# Patient Record
Sex: FEMALE | Race: WHITE | NOT HISPANIC OR LATINO | Employment: FULL TIME | URBAN - METROPOLITAN AREA
[De-identification: names, ages, dates, MRNs, and addresses within clinical notes are randomized per-mention and may not be internally consistent; named-entity substitution may affect disease eponyms.]

---

## 2017-06-29 ENCOUNTER — OFFICE VISIT (OUTPATIENT)
Dept: RETAIL CLINIC | Facility: CLINIC | Age: 27
End: 2017-06-29

## 2017-06-29 DIAGNOSIS — J06.9 UPPER RESPIRATORY TRACT INFECTION, UNSPECIFIED TYPE: ICD-10-CM

## 2017-06-29 DIAGNOSIS — J40 BRONCHITIS: Primary | ICD-10-CM

## 2017-06-29 PROCEDURE — 99213 OFFICE O/P EST LOW 20 MIN: CPT | Performed by: NURSE PRACTITIONER

## 2017-06-29 RX ORDER — BROMPHENIRAMINE MALEATE, PSEUDOEPHEDRINE HYDROCHLORIDE, AND DEXTROMETHORPHAN HYDROBROMIDE 2; 30; 10 MG/5ML; MG/5ML; MG/5ML
5 SYRUP ORAL 4 TIMES DAILY PRN
Qty: 150 ML | Refills: 0 | Status: SHIPPED | OUTPATIENT
Start: 2017-06-29 | End: 2017-06-29 | Stop reason: SDUPTHER

## 2017-06-29 RX ORDER — ALBUTEROL SULFATE 90 UG/1
2 AEROSOL, METERED RESPIRATORY (INHALATION) EVERY 4 HOURS PRN
Qty: 1 INHALER | Refills: 0 | Status: SHIPPED | OUTPATIENT
Start: 2017-06-29 | End: 2017-06-29 | Stop reason: SDUPTHER

## 2017-06-29 RX ORDER — PREDNISONE 10 MG/1
TABLET ORAL
Qty: 21 TABLET | Refills: 0 | Status: SHIPPED | OUTPATIENT
Start: 2017-06-29 | End: 2017-11-21 | Stop reason: SDUPTHER

## 2017-06-29 RX ORDER — ALBUTEROL SULFATE 90 UG/1
2 AEROSOL, METERED RESPIRATORY (INHALATION) EVERY 4 HOURS PRN
Qty: 1 INHALER | Refills: 0 | Status: SHIPPED | OUTPATIENT
Start: 2017-06-29 | End: 2018-01-03

## 2017-06-29 RX ORDER — BROMPHENIRAMINE MALEATE, PSEUDOEPHEDRINE HYDROCHLORIDE, AND DEXTROMETHORPHAN HYDROBROMIDE 2; 30; 10 MG/5ML; MG/5ML; MG/5ML
5 SYRUP ORAL 4 TIMES DAILY PRN
Qty: 150 ML | Refills: 0 | Status: SHIPPED | OUTPATIENT
Start: 2017-06-29 | End: 2018-01-03

## 2017-06-29 RX ORDER — PREDNISONE 10 MG/1
TABLET ORAL
Qty: 21 TABLET | Refills: 0 | Status: SHIPPED | OUTPATIENT
Start: 2017-06-29 | End: 2017-06-29 | Stop reason: SDUPTHER

## 2017-07-01 VITALS
TEMPERATURE: 98.4 F | HEART RATE: 82 BPM | DIASTOLIC BLOOD PRESSURE: 70 MMHG | OXYGEN SATURATION: 98 % | SYSTOLIC BLOOD PRESSURE: 110 MMHG | RESPIRATION RATE: 18 BRPM

## 2017-07-01 PROBLEM — R09.89 CHEST CONGESTION: Status: ACTIVE | Noted: 2017-07-01

## 2017-07-01 PROBLEM — R05.9 COUGH: Status: ACTIVE | Noted: 2017-07-01

## 2017-11-21 ENCOUNTER — OFFICE VISIT (OUTPATIENT)
Dept: RETAIL CLINIC | Facility: CLINIC | Age: 27
End: 2017-11-21

## 2017-11-21 VITALS
HEART RATE: 87 BPM | SYSTOLIC BLOOD PRESSURE: 110 MMHG | OXYGEN SATURATION: 98 % | TEMPERATURE: 99.2 F | DIASTOLIC BLOOD PRESSURE: 78 MMHG
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
## 2017-11-21 PROBLEM — R09.89 CHEST CONGESTION: Status: RESOLVED | Noted: 2017-07-01 | Resolved: 2017-11-21

## 2017-11-21 PROBLEM — R05.9 COUGH: Status: RESOLVED | Noted: 2017-07-01 | Resolved: 2017-11-21

## 2017-11-21 PROCEDURE — 99213 OFFICE O/P EST LOW 20 MIN: CPT | Performed by: NURSE PRACTITIONER

## 2017-11-21 RX ORDER — LEVOTHYROXINE SODIUM 0.07 MG/1
TABLET ORAL
COMMUNITY
Start: 2013-11-21 | End: 2018-04-13

## 2017-11-21 RX ORDER — ESTRADIOL 0.1 MG/G
CREAM VAGINAL
COMMUNITY
Start: 2013-12-06 | End: 2018-01-03

## 2017-11-21 RX ORDER — PREDNISONE 10 MG/1
TABLET ORAL
Qty: 21 TABLET | Refills: 0 | Status: SHIPPED | OUTPATIENT
Start: 2017-11-21 | End: 2018-01-03

## 2017-11-21 RX ORDER — BACLOFEN 20 MG/1
TABLET ORAL
COMMUNITY
Start: 2014-01-07 | End: 2018-01-03

## 2017-11-21 RX ORDER — DULOXETIN HYDROCHLORIDE 30 MG/1
CAPSULE, DELAYED RELEASE ORAL
COMMUNITY
Start: 2014-01-10 | End: 2018-01-03

## 2017-11-21 RX ORDER — PREDNISONE 10 MG/1
TABLET ORAL
Qty: 21 TABLET | Refills: 0 | Status: SHIPPED | OUTPATIENT
Start: 2017-11-21 | End: 2017-11-21 | Stop reason: SDUPTHER

## 2017-11-21 RX ORDER — OXYCODONE HYDROCHLORIDE AND ACETAMINOPHEN 5; 325 MG/1; MG/1
TABLET ORAL
COMMUNITY
Start: 2014-01-17 | End: 2018-01-03

## 2017-11-21 RX ORDER — VALACYCLOVIR HYDROCHLORIDE 500 MG/1
500 TABLET, FILM COATED ORAL AS NEEDED
COMMUNITY
Start: 2014-01-12 | End: 2018-05-25 | Stop reason: SDUPTHER

## 2017-11-21 NOTE — PROGRESS NOTES
Subjective   Oriana Mina is a 27 y.o. female.     HPI Comments: Client came her for the bug bite yesterday, swelling and painful area on her right forearm. She is uptodate for her tetanus shot     Insect Bite   This is a new problem. The current episode started yesterday. The problem has been gradually worsening. Associated symptoms include a rash. Pertinent negatives include no fever or swollen glands. Nothing aggravates the symptoms. She has tried acetaminophen for the symptoms. The treatment provided mild relief.        The following portions of the patient's history were reviewed and updated as appropriate: allergies, current medications, past family history, past medical history, past social history, past surgical history and problem list.    Review of Systems   Constitutional: Negative for fever.   Eyes: Negative.    Respiratory: Negative.    Cardiovascular: Negative.    Skin: Positive for color change and rash.        Around 4cm raised swelling area on the right forearm followed by the bug bite yesteday       Objective   Physical Exam   Constitutional: She appears well-developed and well-nourished.   HENT:   Right Ear: External ear normal.   Left Ear: External ear normal.   Eyes: Pupils are equal, round, and reactive to light.   Neck: Normal range of motion.   Cardiovascular: Normal rate, regular rhythm and normal heart sounds.    Pulmonary/Chest: Effort normal and breath sounds normal. No respiratory distress.   Abdominal: Soft. Bowel sounds are normal.   Skin: Skin is warm and dry. Rash noted. Rash is maculopapular and urticarial.   Around 4cm raised swelling area, tender to touch area  on the right forearm    Nursing note and vitals reviewed.      Assessment/Plan   Oriana was seen today for insect bite.    Diagnoses and all orders for this visit:    Bug bite, initial encounter  -     predniSONE (DELTASONE) 10 MG tablet; 10 MG PACK    Other orders  -     Discontinue: predniSONE (DELTASONE) 10 MG  tablet; 10 mg pack with package instructions      Talked to the patient about the diagnosis and educate the patient and advise to visit to PCP if the symptoms worsens

## 2017-11-21 NOTE — PATIENT INSTRUCTIONS
Insect Bite  Mosquitoes, flies, fleas, bedbugs, and many other insects can bite. Insect bites are different from insect stings. A sting is when poison (venom) is injected into the skin. Insect bites can cause pain or itching for a few days, but they are usually not serious. Some insects can spread diseases to people through a bite.  SYMPTOMS   Symptoms of an insect bite include:  · Itching or pain in the bite area.  · Redness and swelling in the bite area.  · An open wound (skin ulcer).  In many cases, symptoms last for 2-4 days.   DIAGNOSIS   This condition is usually diagnosed based on symptoms and a physical exam.  TREATMENT   Treatment is usually not needed for an insect bite. Symptoms often go away on their own. Your health care provider may recommend creams or lotions to help reduce itching. Antibiotic medicines may be prescribed if the bite becomes infected. A tetanus shot may be given in some cases. If you develop an allergic reaction to an insect bite, your health care provider will prescribe medicines to treat the reaction (antihistamines). This is rare.  HOME CARE INSTRUCTIONS  · Do not scratch the bite area.  · Keep the bite area clean and dry. Wash the bite area daily with soap and water as told by your health care provider.  · If directed, apply ice to the bite area.    Put ice in a plastic bag.    Place a towel between your skin and the bag.    Leave the ice on for 20 minutes, 2-3 times per day.  · To help reduce itching and swelling, try applying a baking soda paste, cortisone cream, or calamine lotion to the bite area as told by your health care provider.  · Apply or take over-the-counter and prescription medicines only as told by your health care provider.  · If you were prescribed an antibiotic medicine, use it as told by your health care provider. Do not stop using the antibiotic even if your condition improves.  · Keep all follow-up visits as told by your health care provider. This is  important.  PREVENTION   · Use insect repellent. The best insect repellents contain:    DEET, picaridin, oil of lemon eucalyptus (OLE), or [id number].    Higher amounts of an active ingredient.  · When you are outdoors, wear clothing that covers your arms and legs.  · Avoid opening windows that do not have window screens.  SEEK MEDICAL CARE IF:  · You have increased redness, swelling, or pain in the bite area.  · You have a fever.  SEEK IMMEDIATE MEDICAL CARE IF:   · You have joint pain.    · You have fluid, blood, or pus coming from the bite area.  · You have a headache or neck pain.  · You have unusual weakness.  · You have a rash.  · You have chest pain or shortness of breath.  · You have abdominal pain, nausea, or vomiting.  · You feel unusually tired or sleepy.     This information is not intended to replace advice given to you by your health care provider. Make sure you discuss any questions you have with your health care provider.     Document Released: 01/25/2006 Document Revised: 04/10/2017 Document Reviewed: 05/04/2016  [employer] Interactive Patient Education ©2017 [employer]  Talked to the patient about the diagnosis and educate the patient and advise to visit to PCP if the symptoms worsens

## 2018-01-03 ENCOUNTER — OFFICE VISIT (OUTPATIENT)
Dept: RETAIL CLINIC | Facility: CLINIC | Age: 28
End: 2018-01-03

## 2018-01-03 VITALS
TEMPERATURE: 96.7 F | SYSTOLIC BLOOD PRESSURE: 118 MMHG | OXYGEN SATURATION: 99 % | HEART RATE: 95 BPM | DIASTOLIC BLOOD PRESSURE: 74 MMHG

## 2018-01-03 DIAGNOSIS — M25.50 ARTHRALGIA, UNSPECIFIED JOINT: Primary | ICD-10-CM

## 2018-01-03 DIAGNOSIS — J06.9 ACUTE URI: ICD-10-CM

## 2018-01-03 LAB
EXPIRATION DATE: NORMAL
FLUAV AG NPH QL: NORMAL
FLUBV AG NPH QL: NORMAL
INTERNAL CONTROL: NORMAL
Lab: NORMAL

## 2018-01-03 PROCEDURE — 99213 OFFICE O/P EST LOW 20 MIN: CPT | Performed by: NURSE PRACTITIONER

## 2018-01-03 PROCEDURE — 87804 INFLUENZA ASSAY W/OPTIC: CPT | Performed by: NURSE PRACTITIONER

## 2018-01-03 RX ORDER — BROMPHENIRAMINE MALEATE, PSEUDOEPHEDRINE HYDROCHLORIDE, AND DEXTROMETHORPHAN HYDROBROMIDE 2; 30; 10 MG/5ML; MG/5ML; MG/5ML
SYRUP ORAL
Qty: 240 ML | Refills: 0 | Status: SHIPPED | OUTPATIENT
Start: 2018-01-03 | End: 2018-04-13

## 2018-01-03 RX ORDER — FLUTICASONE PROPIONATE 50 MCG
1 SPRAY, SUSPENSION (ML) NASAL 2 TIMES DAILY
Qty: 1 BOTTLE | Refills: 0 | Status: SHIPPED | OUTPATIENT
Start: 2018-01-03 | End: 2018-01-17

## 2018-01-03 NOTE — PROGRESS NOTES
Subjective     Oriana Mina is a 27 y.o.. female.     Flu Symptoms   This is a new problem. The current episode started yesterday. The problem has been unchanged. Associated symptoms include arthralgias, congestion, coughing (minor, non-productive), fatigue, headaches and nausea. Pertinent negatives include no abdominal pain, fever, sore throat or vomiting. Treatments tried: ibuprofen. The treatment provided no relief.       The following portions of the patient's history were reviewed and updated as appropriate: allergies, current medications, past family history, past medical history, past social history, past surgical history and problem list.    Review of Systems   Constitutional: Positive for fatigue. Negative for fever.   HENT: Positive for congestion and rhinorrhea. Negative for ear pain and sore throat.    Eyes: Negative.    Respiratory: Positive for cough (minor, non-productive).    Gastrointestinal: Positive for diarrhea and nausea. Negative for abdominal pain and vomiting.   Genitourinary: Negative.    Musculoskeletal: Positive for arthralgias.   Neurological: Positive for headaches.       Objective     Vitals:    01/03/18 1527   BP: 118/74   Pulse: 95   Temp: 96.7 °F (35.9 °C)   TempSrc: Tympanic   SpO2: 99%       Physical Exam   Constitutional: She is oriented to person, place, and time. She appears well-developed and well-nourished.   HENT:   Head: Normocephalic and atraumatic.   Right Ear: Tympanic membrane normal. Tympanic membrane is not erythematous.   Left Ear: Tympanic membrane normal. Tympanic membrane is not erythematous.   Nose: Mucosal edema and rhinorrhea present. Right sinus exhibits no maxillary sinus tenderness and no frontal sinus tenderness. Left sinus exhibits no maxillary sinus tenderness and no frontal sinus tenderness.   Mouth/Throat: Oropharynx is clear and moist.   PND   Eyes: Conjunctivae are normal. Pupils are equal, round, and reactive to light.   Cardiovascular: Normal  "rate and regular rhythm.    No murmur heard.  Pulmonary/Chest: Effort normal. She has no wheezes. She has no rhonchi. She has no rales.   Musculoskeletal: Normal range of motion.   Lymphadenopathy:     She has no cervical adenopathy.   Neurological: She is alert and oriented to person, place, and time.   Skin: Skin is warm and dry.   Vitals reviewed.      Lab Results (last 24 hours)     Procedure Component Value Units Date/Time    POC Influenza A / B [[id number]]  (Normal) Collected:  01/03/18 1547    Specimen:  Swab Updated:  01/03/18 1551     Rapid Influenza A Ag neg     Rapid Influenza B Ag neg     Internal Control Passed     Lot Number [id number]     Expiration Date 2019-6          Assessment/Plan   Oriana was seen today for flu symptoms.    Diagnoses and all orders for this visit:    Arthralgia, unspecified joint  -     POC Influenza A / B    Acute URI  -     fluticasone (FLONASE) 50 MCG/ACT nasal spray; 1 spray into each nostril 2 (Two) Times a Day for 14 days.  -     brompheniramine-pseudoephedrine-DM (BROMFED DM) 30-2-10 MG/5ML syrup; 10 ml  ever 4 hours as needed for cough, congestion, allergies        Patient Instructions   Upper Respiratory Infection, Adult  Most upper respiratory infections (URIs) are a viral infection of the air passages leading to the lungs. A URI affects the nose, throat, and upper air passages. The most common type of URI is nasopharyngitis and is typically referred to as \"the common cold.\"  URIs run their course and usually go away on their own. Most of the time, a URI does not require medical attention, but sometimes a bacterial infection in the upper airways can follow a viral infection. This is called a secondary infection. Sinus and middle ear infections are common types of secondary upper respiratory infections.  Bacterial pneumonia can also complicate a URI. A URI can worsen asthma and chronic obstructive pulmonary disease (COPD). Sometimes, these complications can require " emergency medical care and may be life threatening.   CAUSES  Almost all URIs are caused by viruses. A virus is a type of germ and can spread from one person to another.   RISKS FACTORS  You may be at risk for a URI if:   · You smoke.    · You have chronic heart or lung disease.  · You have a weakened defense (immune) system.    · You are very young or very old.    · You have nasal allergies or asthma.  · You work in crowded or poorly ventilated areas.  · You work in health care facilities or schools.  SIGNS AND SYMPTOMS   Symptoms typically develop 2-3 days after you come in contact with a cold virus. Most viral URIs last 7-10 days. However, viral URIs from the influenza virus (flu virus) can last 14-18 days and are typically more severe. Symptoms may include:   · Runny or stuffy (congested) nose.    · Sneezing.    · Cough.    · Sore throat.    · Headache.    · Fatigue.    · Fever.    · Loss of appetite.    · Pain in your forehead, behind your eyes, and over your cheekbones (sinus pain).  · Muscle aches.    DIAGNOSIS   Your health care provider may diagnose a URI by:  · Physical exam.  · Tests to check that your symptoms are not due to another condition such as:  ¨ Strep throat.  ¨ Sinusitis.  ¨ Pneumonia.  ¨ Asthma.  TREATMENT   A URI goes away on its own with time. It cannot be cured with medicines, but medicines may be prescribed or recommended to relieve symptoms. Medicines may help:  · Reduce your fever.  · Reduce your cough.  · Relieve nasal congestion.  HOME CARE INSTRUCTIONS   · Take medicines only as directed by your health care provider.    · Gargle warm saltwater or take cough drops to comfort your throat as directed by your health care provider.  · Use a warm mist humidifier or inhale steam from a shower to increase air moisture. This may make it easier to breathe.  · Drink enough fluid to keep your urine clear or pale yellow.    · Eat soups and other clear broths and maintain good nutrition.    · Rest  as needed.    · Return to work when your temperature has returned to normal or as your health care provider advises. You may need to stay home longer to avoid infecting others. You can also use a face mask and careful hand washing to prevent spread of the virus.  · Increase the usage of your inhaler if you have asthma.    · Do not use any tobacco products, including cigarettes, chewing tobacco, or electronic cigarettes. If you need help quitting, ask your health care provider.  PREVENTION   The best way to protect yourself from getting a cold is to practice good hygiene.   · Avoid oral or hand contact with people with cold symptoms.    · Wash your hands often if contact occurs.    There is no clear evidence that vitamin C, vitamin E, echinacea, or exercise reduces the chance of developing a cold. However, it is always recommended to get plenty of rest, exercise, and practice good nutrition.   SEEK MEDICAL CARE IF:   · You are getting worse rather than better.    · Your symptoms are not controlled by medicine.    · You have chills.  · You have worsening shortness of breath.  · You have brown or red mucus.  · You have yellow or brown nasal discharge.  · You have pain in your face, especially when you bend forward.  · You have a fever.  · You have swollen neck glands.  · You have pain while swallowing.  · You have white areas in the back of your throat.  SEEK IMMEDIATE MEDICAL CARE IF:   · You have severe or persistent:    Headache.    Ear pain.    Sinus pain.    Chest pain.  · You have chronic lung disease and any of the following:    Wheezing.    Prolonged cough.    Coughing up blood.    A change in your usual mucus.  · You have a stiff neck.  · You have changes in your:    Vision.    Hearing.    Thinking.    Mood.  MAKE SURE YOU:   · Understand these instructions.  · Will watch your condition.  · Will get help right away if you are not doing well or get worse.     This information is not intended to replace advice  given to you by your health care provider. Make sure you discuss any questions you have with your health care provider.     Document Released: 06/13/2002 Document Revised: 05/03/2016 Document Reviewed: 03/25/2015  [employer] Interactive Patient Education ©2017 [employer]        Return if symptoms worsen or fail to improve with urgent care/ER.

## 2018-01-03 NOTE — PATIENT INSTRUCTIONS
"Upper Respiratory Infection, Adult  Most upper respiratory infections (URIs) are a viral infection of the air passages leading to the lungs. A URI affects the nose, throat, and upper air passages. The most common type of URI is nasopharyngitis and is typically referred to as \"the common cold.\"  URIs run their course and usually go away on their own. Most of the time, a URI does not require medical attention, but sometimes a bacterial infection in the upper airways can follow a viral infection. This is called a secondary infection. Sinus and middle ear infections are common types of secondary upper respiratory infections.  Bacterial pneumonia can also complicate a URI. A URI can worsen asthma and chronic obstructive pulmonary disease (COPD). Sometimes, these complications can require emergency medical care and may be life threatening.   CAUSES  Almost all URIs are caused by viruses. A virus is a type of germ and can spread from one person to another.   RISKS FACTORS  You may be at risk for a URI if:   · You smoke.    · You have chronic heart or lung disease.  · You have a weakened defense (immune) system.    · You are very young or very old.    · You have nasal allergies or asthma.  · You work in crowded or poorly ventilated areas.  · You work in health care facilities or schools.  SIGNS AND SYMPTOMS   Symptoms typically develop 2-3 days after you come in contact with a cold virus. Most viral URIs last 7-10 days. However, viral URIs from the influenza virus (flu virus) can last 14-18 days and are typically more severe. Symptoms may include:   · Runny or stuffy (congested) nose.    · Sneezing.    · Cough.    · Sore throat.    · Headache.    · Fatigue.    · Fever.    · Loss of appetite.    · Pain in your forehead, behind your eyes, and over your cheekbones (sinus pain).  · Muscle aches.    DIAGNOSIS   Your health care provider may diagnose a URI by:  · Physical exam.  · Tests to check that your symptoms are not due to " another condition such as:  ¨ Strep throat.  ¨ Sinusitis.  ¨ Pneumonia.  ¨ Asthma.  TREATMENT   A URI goes away on its own with time. It cannot be cured with medicines, but medicines may be prescribed or recommended to relieve symptoms. Medicines may help:  · Reduce your fever.  · Reduce your cough.  · Relieve nasal congestion.  HOME CARE INSTRUCTIONS   · Take medicines only as directed by your health care provider.    · Gargle warm saltwater or take cough drops to comfort your throat as directed by your health care provider.  · Use a warm mist humidifier or inhale steam from a shower to increase air moisture. This may make it easier to breathe.  · Drink enough fluid to keep your urine clear or pale yellow.    · Eat soups and other clear broths and maintain good nutrition.    · Rest as needed.    · Return to work when your temperature has returned to normal or as your health care provider advises. You may need to stay home longer to avoid infecting others. You can also use a face mask and careful hand washing to prevent spread of the virus.  · Increase the usage of your inhaler if you have asthma.    · Do not use any tobacco products, including cigarettes, chewing tobacco, or electronic cigarettes. If you need help quitting, ask your health care provider.  PREVENTION   The best way to protect yourself from getting a cold is to practice good hygiene.   · Avoid oral or hand contact with people with cold symptoms.    · Wash your hands often if contact occurs.    There is no clear evidence that vitamin C, vitamin E, echinacea, or exercise reduces the chance of developing a cold. However, it is always recommended to get plenty of rest, exercise, and practice good nutrition.   SEEK MEDICAL CARE IF:   · You are getting worse rather than better.    · Your symptoms are not controlled by medicine.    · You have chills.  · You have worsening shortness of breath.  · You have brown or red mucus.  · You have yellow or brown nasal  discharge.  · You have pain in your face, especially when you bend forward.  · You have a fever.  · You have swollen neck glands.  · You have pain while swallowing.  · You have white areas in the back of your throat.  SEEK IMMEDIATE MEDICAL CARE IF:   · You have severe or persistent:    Headache.    Ear pain.    Sinus pain.    Chest pain.  · You have chronic lung disease and any of the following:    Wheezing.    Prolonged cough.    Coughing up blood.    A change in your usual mucus.  · You have a stiff neck.  · You have changes in your:    Vision.    Hearing.    Thinking.    Mood.  MAKE SURE YOU:   · Understand these instructions.  · Will watch your condition.  · Will get help right away if you are not doing well or get worse.     This information is not intended to replace advice given to you by your health care provider. Make sure you discuss any questions you have with your health care provider.     Document Released: 06/13/2002 Document Revised: 05/03/2016 Document Reviewed: 03/25/2015  [employer] Interactive Patient Education ©2017 [employer]

## 2018-04-13 ENCOUNTER — OFFICE VISIT (OUTPATIENT)
Dept: INTERNAL MEDICINE | Facility: CLINIC | Age: 28
End: 2018-04-13

## 2018-04-13 VITALS
BODY MASS INDEX: 35.54 KG/M2 | DIASTOLIC BLOOD PRESSURE: 74 MMHG | HEART RATE: 94 BPM | WEIGHT: 213.3 LBS | HEIGHT: 65 IN | SYSTOLIC BLOOD PRESSURE: 116 MMHG | RESPIRATION RATE: 16 BRPM | OXYGEN SATURATION: 98 %

## 2018-04-13 DIAGNOSIS — E03.8 OTHER SPECIFIED HYPOTHYROIDISM: Primary | ICD-10-CM

## 2018-04-13 DIAGNOSIS — L02.214 ABSCESS OF GROIN, LEFT: ICD-10-CM

## 2018-04-13 DIAGNOSIS — Z13.220 SCREENING FOR HYPERLIPIDEMIA: ICD-10-CM

## 2018-04-13 DIAGNOSIS — Z71.3 WEIGHT LOSS COUNSELING, ENCOUNTER FOR: ICD-10-CM

## 2018-04-13 DIAGNOSIS — E66.9 OBESITY (BMI 30-39.9): ICD-10-CM

## 2018-04-13 DIAGNOSIS — Z13.1 SCREENING FOR DIABETES MELLITUS: ICD-10-CM

## 2018-04-13 PROBLEM — A60.04 HERPES SIMPLEX VULVOVAGINITIS: Status: ACTIVE | Noted: 2018-04-13
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
## 2018-04-13 PROBLEM — B97.7 HPV IN FEMALE: Status: ACTIVE | Noted: 2018-04-13

## 2018-04-13 PROCEDURE — 99214 OFFICE O/P EST MOD 30 MIN: CPT | Performed by: INTERNAL MEDICINE

## 2018-04-13 RX ORDER — LEVOTHYROXINE SODIUM 0.1 MG/1
100 TABLET ORAL DAILY
COMMUNITY
End: 2018-05-25 | Stop reason: SDUPTHER

## 2018-04-13 RX ORDER — CEPHALEXIN 500 MG/1
500 CAPSULE ORAL 3 TIMES DAILY
Qty: 21 CAPSULE | Refills: 0 | Status: SHIPPED | OUTPATIENT
Start: 2018-04-13 | End: 2018-04-20

## 2018-04-13 NOTE — PROGRESS NOTES
"      Oriana Mina is a 28 y.o. female, who presents with a chief complaint of   Chief Complaint   Patient presents with   • Establish Care     thyroid concerns    • Weight Gain   • Rash       29 yo F here to establish care. All of her problems are new to. She has a medically fragile child that is 6 years old and she lives [place] [facility] [place]. She has struggled with her weight for about 5 years. Started to get worse in 2014 and feels that she has never been able to get her thyroid under good control. She is a super busy mother. She has not had her thyroid checked in 2 years. She doesn't take her medication every day. She does et a lot and uses that as a coping mechanisms. She over eats a lot and snacks a lot. She doesn't exercise regularly. She eats mainly carbs.     Last pap was in 2017 and was normal. Followed by Dr. Johnson     She has also had 2 weeks of \"abscesses\" in her groin. They hurt where her legs touch and rub. The areas are red and look like pimples. 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 No fever or chills and hasn't tried anything.          The following portions of the patient's history were reviewed and updated as appropriate: allergies, current medications, past family history, past medical history, past social history, past surgical history and problem list.    Allergies: Sulfa antibiotics    Current Outpatient Prescriptions:   •  levothyroxine (SYNTHROID, LEVOTHROID) 100 MCG tablet, Take 100 mcg by mouth Daily., Disp: , Rfl:   •  valACYclovir (VALTREX) 500 MG tablet, Take 500 mg by mouth As Needed., Disp: , Rfl:   •  cephalexin (KEFLEX) 500 MG capsule, Take 1 capsule by mouth 3 (Three) Times a Day for 7 days., Disp: 21 capsule, Rfl: 0  Medications Discontinued During This Encounter   Medication Reason   • brompheniramine-pseudoephedrine-DM (BROMFED DM) 30-2-10 MG/5ML syrup *Therapy completed   • cetirizine (ZyrTEC) 10 MG tablet *Therapy completed   • levothyroxine (SYNTHROID, LEVOTHROID) 75 MCG tablet " "*Therapy completed       Review of Systems   Constitutional: Positive for fatigue and unexpected weight change (weight gain over the last 5 years ). Negative for chills.   HENT: Negative for congestion and postnasal drip.    Respiratory: Negative for cough and shortness of breath.    Cardiovascular: Negative for chest pain and palpitations.   Gastrointestinal: Positive for diarrhea. Negative for abdominal pain, nausea and vomiting.   Endocrine: Negative for cold intolerance and heat intolerance.   Genitourinary: Negative for difficulty urinating and dysuria.   Skin: Negative for rash.   Allergic/Immunologic: Positive for environmental allergies. Negative for food allergies.   Neurological: Negative for dizziness and headaches.   Hematological: Negative for adenopathy.             /74 (BP Location: Left arm, Patient Position: Sitting, Cuff Size: Adult)   Pulse 94   Resp 16   Ht 165.1 cm (65\")   Wt 96.8 kg (213 lb 4.8 oz)   SpO2 98%   BMI 35.49 kg/m²       Physical Exam   Constitutional: She is oriented to person, place, and time. She appears well-developed and well-nourished. No distress.   HENT:   Head: Normocephalic and atraumatic.   Right Ear: External ear normal.   Left Ear: External ear normal.   Mouth/Throat: Oropharynx is clear and moist. No oropharyngeal exudate.   Eyes: Conjunctivae are normal. Right eye exhibits no discharge. Left eye exhibits no discharge. No scleral icterus.   Neck: Neck supple.   Cardiovascular: Normal rate, regular rhythm and normal heart sounds.  Exam reveals no gallop and no friction rub.    No murmur heard.  Pulmonary/Chest: Effort normal and breath sounds normal. No respiratory distress. She has no wheezes. She has no rales.   Abdominal: Soft. Bowel sounds are normal. She exhibits no distension and no mass. There is no tenderness. There is no guarding.   Lymphadenopathy:     She has no cervical adenopathy.   Neurological: She is alert and oriented to person, place, and " time.   Skin: Skin is warm. Rash (cluster of erythematous abscesses (1-2cm in size) of left and right groi. Non-draining ) noted.   Psychiatric: She has a normal mood and affect. Her behavior is normal.   Nursing note and vitals reviewed.        Results for orders placed or performed in visit on 01/03/18   POC Influenza A / B   Result Value Ref Range    Rapid Influenza A Ag neg     Rapid Influenza B Ag neg     Internal Control Passed Passed    Lot Number [id number]     Expiration Date 2,019-6            Oriana was seen today for establish care, weight gain and rash.    Diagnoses and all orders for this visit:    Other specified hypothyroidism  -     CBC & Differential  -     TSH  -     T4, Free    Obesity (BMI 30-39.9)    Weight loss counseling, encounter for    Screening for diabetes mellitus  -     Comprehensive Metabolic Panel  -     Urinalysis With / Culture If Indicated - Urine, Clean Catch    Screening for hyperlipidemia  -     Lipid Panel With LDL / HDL Ratio    Abscess of groin, left    Other orders  -     cephalexin (KEFLEX) 500 MG capsule; Take 1 capsule by mouth 3 (Three) Times a Day for 7 days.        Will check thyroid to make sure that she is on correct dose and bring back for physical to go over her labs. Will change dose based on labs.     We talked extensively about her poor diet and exercise. We discussed a plan about eating more protein, fruits and vegetables and avoiding snacking/sweets and carbs. Will work on exercise next time. Walking is okay or now. Spent 50% of 25 minutes counseling on diet and exercise.     Small area of abscesses or possible hydradenitis in her groin. Will put on Keflex and if doesn't clear, will consider surgery referral and/or starting Spironolactone.     Needs screening labs and will return to go over these. EKG that day.     Pap is up to date.   Return in about 6 weeks (around 5/25/2018) for Recheck.    Tara Jimenes MD  04/13/2018

## 2018-04-18 LAB
ALBUMIN SERPL-MCNC: 4.5 G/DL (ref 3.5–5.2)
ALBUMIN/GLOB SERPL: 1.6 G/DL
ALP SERPL-CCNC: 79 U/L (ref 40–129)
ALT SERPL-CCNC: 13 U/L (ref 5–33)
APPEARANCE UR: CLEAR
AST SERPL-CCNC: 14 U/L (ref 5–32)
BACTERIA #/AREA URNS HPF: NORMAL /HPF
BASOPHILS # BLD AUTO: 0.02 10*3/MM3 (ref 0–0.2)
BASOPHILS NFR BLD AUTO: 0.2 % (ref 0–2)
BILIRUB SERPL-MCNC: 0.3 MG/DL (ref 0.2–1.2)
BILIRUB UR QL STRIP: NEGATIVE
BUN SERPL-MCNC: 9 MG/DL (ref 6–20)
BUN/CREAT SERPL: 17.3 (ref 7–25)
CALCIUM SERPL-MCNC: 8.8 MG/DL (ref 8.6–10.5)
CHLORIDE SERPL-SCNC: 102 MMOL/L (ref 98–107)
CHOLEST SERPL-MCNC: 176 MG/DL (ref 0–200)
CO2 SERPL-SCNC: 25.4 MMOL/L (ref 22–29)
COLOR UR: YELLOW
CREAT SERPL-MCNC: 0.52 MG/DL (ref 0.57–1)
EOSINOPHIL # BLD AUTO: 0.1 10*3/MM3 (ref 0.1–0.3)
EOSINOPHIL NFR BLD AUTO: 1 % (ref 0–4)
EPI CELLS #/AREA URNS HPF: NORMAL /HPF
ERYTHROCYTE [DISTWIDTH] IN BLOOD BY AUTOMATED COUNT: 13.9 % (ref 11.5–14.5)
GFR SERPLBLD CREATININE-BSD FMLA CKD-EPI: 140 ML/MIN/1.73
GFR SERPLBLD CREATININE-BSD FMLA CKD-EPI: >150 ML/MIN/1.73
GLOBULIN SER CALC-MCNC: 2.8 GM/DL
GLUCOSE SERPL-MCNC: 91 MG/DL (ref 65–99)
GLUCOSE UR QL: NEGATIVE
HCT VFR BLD AUTO: 40.4 % (ref 37–47)
HDLC SERPL-MCNC: 43 MG/DL (ref 40–60)
HGB BLD-MCNC: 13.2 G/DL (ref 12–16)
HGB UR QL STRIP: ABNORMAL
IMM GRANULOCYTES # BLD: 0.04 10*3/MM3 (ref 0–0.03)
IMM GRANULOCYTES NFR BLD: 0.4 % (ref 0–0.5)
KETONES UR QL STRIP: NEGATIVE
LDLC SERPL CALC-MCNC: 112 MG/DL (ref 0–100)
LDLC/HDLC SERPL: 2.6 {RATIO}
LEUKOCYTE ESTERASE UR QL STRIP: NEGATIVE
LYMPHOCYTES # BLD AUTO: 2.19 10*3/MM3 (ref 0.6–4.8)
LYMPHOCYTES NFR BLD AUTO: 21.9 % (ref 20–45)
MCH RBC QN AUTO: 28.6 PG (ref 27–31)
MCHC RBC AUTO-ENTMCNC: 32.7 G/DL (ref 31–37)
MCV RBC AUTO: 87.4 FL (ref 81–99)
MICRO URNS: ABNORMAL
MONOCYTES # BLD AUTO: 0.6 10*3/MM3 (ref 0–1)
MONOCYTES NFR BLD AUTO: 6 % (ref 3–8)
MUCOUS THREADS URNS QL MICRO: PRESENT /HPF
NEUTROPHILS # BLD AUTO: 7.04 10*3/MM3 (ref 1.5–8.3)
NEUTROPHILS NFR BLD AUTO: 70.5 % (ref 45–70)
NITRITE UR QL STRIP: NEGATIVE
NRBC BLD AUTO-RTO: 0 /100 WBC (ref 0–0)
PH UR STRIP: 6.5 [PH] (ref 5–7.5)
PLATELET # BLD AUTO: 259 10*3/MM3 (ref 140–500)
POTASSIUM SERPL-SCNC: 4.3 MMOL/L (ref 3.5–5.2)
PROT SERPL-MCNC: 7.3 G/DL (ref 6–8.5)
PROT UR QL STRIP: NEGATIVE
RBC # BLD AUTO: 4.62 10*6/MM3 (ref 4.2–5.4)
RBC #/AREA URNS HPF: NORMAL /HPF
SODIUM SERPL-SCNC: 140 MMOL/L (ref 136–145)
SP GR UR: 1.02 (ref 1–1.03)
T4 FREE SERPL-MCNC: 1.88 NG/DL (ref 0.93–1.7)
TRIGL SERPL-MCNC: 105 MG/DL (ref 0–150)
TSH SERPL DL<=0.005 MIU/L-ACNC: 1.74 MIU/ML (ref 0.27–4.2)
URINALYSIS REFLEX: ABNORMAL
UROBILINOGEN UR STRIP-MCNC: 0.2 MG/DL (ref 0.2–1)
VLDLC SERPL CALC-MCNC: 21 MG/DL (ref 7–27)
WBC # BLD AUTO: 9.99 10*3/MM3 (ref 4.8–10.8)
WBC #/AREA URNS HPF: NORMAL /HPF

## 2018-05-10 DIAGNOSIS — M26.622 ARTHRALGIA OF LEFT TEMPOROMANDIBULAR JOINT: Primary | ICD-10-CM

## 2018-05-25 ENCOUNTER — OFFICE VISIT (OUTPATIENT)
Dept: INTERNAL MEDICINE | Facility: CLINIC | Age: 28
End: 2018-05-25

## 2018-05-25 VITALS
TEMPERATURE: 99 F | HEART RATE: 86 BPM | SYSTOLIC BLOOD PRESSURE: 118 MMHG | BODY MASS INDEX: 35.16 KG/M2 | WEIGHT: 211 LBS | HEIGHT: 65 IN | OXYGEN SATURATION: 98 % | DIASTOLIC BLOOD PRESSURE: 74 MMHG | RESPIRATION RATE: 16 BRPM

## 2018-05-25 DIAGNOSIS — E66.9 OBESITY (BMI 30-39.9): ICD-10-CM

## 2018-05-25 DIAGNOSIS — K58.0 IRRITABLE BOWEL SYNDROME WITH DIARRHEA: ICD-10-CM

## 2018-05-25 DIAGNOSIS — R29.818 SUSPECTED SLEEP APNEA: ICD-10-CM

## 2018-05-25 DIAGNOSIS — E03.8 OTHER SPECIFIED HYPOTHYROIDISM: Primary | ICD-10-CM

## 2018-05-25 DIAGNOSIS — R53.83 FATIGUE, UNSPECIFIED TYPE: ICD-10-CM

## 2018-05-25 DIAGNOSIS — E78.00 PURE HYPERCHOLESTEROLEMIA: ICD-10-CM

## 2018-05-25 PROCEDURE — 99214 OFFICE O/P EST MOD 30 MIN: CPT | Performed by: INTERNAL MEDICINE

## 2018-05-25 RX ORDER — VALACYCLOVIR HYDROCHLORIDE 1 G/1
TABLET, FILM COATED ORAL
Qty: 30 TABLET | Refills: 0 | Status: SHIPPED | OUTPATIENT
Start: 2018-05-25 | End: 2018-06-21 | Stop reason: SDUPTHER

## 2018-05-25 RX ORDER — LEVOTHYROXINE SODIUM 0.1 MG/1
100 TABLET ORAL DAILY
Qty: 30 TABLET | Refills: 6 | Status: SHIPPED | OUTPATIENT
Start: 2018-05-25 | End: 2019-02-20 | Stop reason: SDUPTHER

## 2018-05-25 NOTE — PROGRESS NOTES
Oriana Mina is a 28 y.o. female, who presents with a chief complaint of   Chief Complaint   Patient presents with   • Follow-up     6 wk f/u, lab results    • Hypothyroidism       29 yo F here for follow up and is doing well.     She has chronic hypothyroidism and is doing well on 100mcg per day. She still feels fatigued on most days. At night she has a hard time sleeping due to waking up. She is light sleeper. 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 She does snore at night.     She is exercising everyday. She walks about 1 mile per day. Doesn't take elevators.  She is cutting out all sugar and soft drinks.     She has chronic diarrhea related to food intake. Feels crampy pain and then has a BM and does away. No weight loss or blood in her stool. She does sometimes have mucous like material in her stool. No fam h/o IBD.          The following portions of the patient's history were reviewed and updated as appropriate: allergies, current medications, past family history, past medical history, past social history, past surgical history and problem list.    Allergies: Sulfa antibiotics    Current Outpatient Prescriptions:   •  levothyroxine (SYNTHROID, LEVOTHROID) 100 MCG tablet, Take 1 tablet by mouth Daily., Disp: 30 tablet, Rfl: 6  •  valACYclovir (VALTREX) 1000 MG tablet, 1 gram PO daily x 3 days when you have outbreak, Disp: 30 tablet, Rfl: 0  Medications Discontinued During This Encounter   Medication Reason   • levothyroxine (SYNTHROID, LEVOTHROID) 100 MCG tablet Reorder   • valACYclovir (VALTREX) 500 MG tablet Reorder       Review of Systems   Constitutional: Positive for fatigue and unexpected weight change (cannot lose weight ). Negative for chills and fever.   Respiratory: Negative for cough and shortness of breath.    Cardiovascular: Negative for chest pain and palpitations.   Gastrointestinal: Positive for abdominal pain (crampy in nature ) and diarrhea. Negative for nausea and vomiting.   Endocrine:  "Negative for cold intolerance and heat intolerance.   Skin: Negative for rash.             /74 (BP Location: Left arm, Patient Position: Sitting, Cuff Size: Adult)   Pulse 86   Temp 99 °F (37.2 °C) (Oral)   Resp 16   Ht 165.1 cm (65\")   Wt 95.7 kg (211 lb)   SpO2 98%   BMI 35.11 kg/m²       Physical Exam   Constitutional: She is oriented to person, place, and time. She appears well-developed and well-nourished. No distress.   HENT:   Head: Normocephalic and atraumatic.   Right Ear: External ear normal.   Left Ear: External ear normal.   Mouth/Throat: Oropharynx is clear and moist. No oropharyngeal exudate.   Eyes: Conjunctivae are normal. Right eye exhibits no discharge. Left eye exhibits no discharge. No scleral icterus.   Neck: Neck supple.   Cardiovascular: Normal rate, regular rhythm and normal heart sounds.  Exam reveals no gallop and no friction rub.    No murmur heard.  Pulmonary/Chest: Effort normal and breath sounds normal. No respiratory distress. She has no wheezes. She has no rales.   Abdominal: Soft. Bowel sounds are normal. She exhibits no distension and no mass. There is no tenderness. There is no guarding.   Obese abdomen    Lymphadenopathy:     She has no cervical adenopathy.   Neurological: She is alert and oriented to person, place, and time.   Skin: Skin is warm. No rash noted.   Psychiatric: She has a normal mood and affect. Her behavior is normal.   Nursing note and vitals reviewed.        Results for orders placed or performed in visit on 04/13/18   Comprehensive Metabolic Panel   Result Value Ref Range    Glucose 91 65 - 99 mg/dL    BUN 9 6 - 20 mg/dL    Creatinine 0.52 (L) 0.57 - 1.00 mg/dL    eGFR Non African Am 140 >60 mL/min/1.73    eGFR African Am >150 >60 mL/min/1.73    BUN/Creatinine Ratio 17.3 7.0 - 25.0    Sodium 140 136 - 145 mmol/L    Potassium 4.3 3.5 - 5.2 mmol/L    Chloride 102 98 - 107 mmol/L    Total CO2 25.4 22.0 - 29.0 mmol/L    Calcium 8.8 8.6 - 10.5 mg/dL    " Total Protein 7.3 6.0 - 8.5 g/dL    Albumin 4.50 3.50 - 5.20 g/dL    Globulin 2.8 gm/dL    A/G Ratio 1.6 g/dL    Total Bilirubin 0.3 0.2 - 1.2 mg/dL    Alkaline Phosphatase 79 40 - 129 U/L    AST (SGOT) 14 5 - 32 U/L    ALT (SGPT) 13 5 - 33 U/L   Lipid Panel With LDL / HDL Ratio   Result Value Ref Range    Total Cholesterol 176 0 - 200 mg/dL    Triglycerides 105 0 - 150 mg/dL    HDL Cholesterol 43 40 - 60 mg/dL    VLDL Cholesterol 21 7 - 27 mg/dL    LDL Cholesterol  112 (H) 0 - 100 mg/dL    LDL/HDL Ratio 2.60    TSH   Result Value Ref Range    TSH 1.740 0.270 - 4.200 mIU/mL   T4, Free   Result Value Ref Range    Free T4 1.88 (H) 0.93 - 1.70 ng/dL   Urinalysis With / Culture If Indicated - Urine, Clean Catch   Result Value Ref Range    Specific Gravity, UA 1.025 1.005 - 1.030    pH, UA 6.5 5.0 - 7.5    Color, UA Yellow Yellow    Appearance, UA Clear Clear    Leukocytes, UA Negative Negative    Protein Negative Negative/Trace    Glucose, UA Negative Negative    Ketones Negative Negative    Blood, UA Trace (A) Negative    Bilirubin, UA Negative Negative    Urobilinogen, UA 0.2 0.2 - 1.0 mg/dL    Nitrite, UA Negative Negative    Microscopic Examination See below:     Urinalysis Reflex Comment    Microscopic Examination   Result Value Ref Range    WBC, UA 0-5 0 - 5 /hpf    RBC, UA 0-2 0 - 2 /hpf    Epithelial Cells (non renal) 0-10 0 - 10 /hpf    Mucus, UA Present Not Estab. /hpf    Bacteria, UA Few None seen/Few /hpf   CBC & Differential   Result Value Ref Range    WBC 9.99 4.80 - 10.80 10*3/mm3    RBC 4.62 4.20 - 5.40 10*6/mm3    Hemoglobin 13.2 12.0 - 16.0 g/dL    Hematocrit 40.4 37.0 - 47.0 %    MCV 87.4 81.0 - 99.0 fL    MCH 28.6 27.0 - 31.0 pg    MCHC 32.7 31.0 - 37.0 g/dL    RDW 13.9 11.5 - 14.5 %    Platelets 259 140 - 500 10*3/mm3    Neutrophil Rel % 70.5 (H) 45.0 - 70.0 %    Lymphocyte Rel % 21.9 20.0 - 45.0 %    Monocyte Rel % 6.0 3.0 - 8.0 %    Eosinophil Rel % 1.0 0.0 - 4.0 %    Basophil Rel % 0.2 0.0 - 2.0  %    Neutrophils Absolute 7.04 1.50 - 8.30 10*3/mm3    Lymphocytes Absolute 2.19 0.60 - 4.80 10*3/mm3    Monocytes Absolute 0.60 0.00 - 1.00 10*3/mm3    Eosinophils Absolute 0.10 0.10 - 0.30 10*3/mm3    Basophils Absolute 0.02 0.00 - 0.20 10*3/mm3    Immature Granulocyte Rel % 0.4 0.0 - 0.5 %    Immature Grans Absolute 0.04 (H) 0.00 - 0.03 10*3/mm3    nRBC 0.0 0.0 - 0.0 /100 WBC           Oriana was seen today for follow-up and hypothyroidism.    Diagnoses and all orders for this visit:    Other specified hypothyroidism    Suspected sleep apnea  -     Ambulatory Referral to Sleep Medicine    Fatigue, unspecified type    Pure hypercholesterolemia    Irritable bowel syndrome with diarrhea    Obesity (BMI 30-39.9)    Other orders  -     levothyroxine (SYNTHROID, LEVOTHROID) 100 MCG tablet; Take 1 tablet by mouth Daily.  -     valACYclovir (VALTREX) 1000 MG tablet; 1 gram PO daily x 3 days when you have outbreak      TSH has been stable and patient is doing well. Will continue current regimen of levothyroxine 100 mcg and follow up levels in 4 months.     Send for sleep study as she is fatigued, overweight and is having signs/symptoms of sleep apnea.     Her LDL is slightly. Needs to continue to work on more exercise. I encouraged her to increase exercise and do more than just walking. Limit calories.     No red flag signs on diarrhea. Will try low FODMAP and add Probiotic. If not better when I see her back, may consider seeing GI. Labs stable which is reassuring.       Return in about 4 months (around 9/25/2018) for Recheck.    Tara Jimenes MD  05/25/2018

## 2018-06-07 ENCOUNTER — HOSPITAL ENCOUNTER (OUTPATIENT)
Dept: GENERAL RADIOLOGY | Facility: HOSPITAL | Age: 28
Discharge: HOME | End: 2018-06-07
Attending: INTERNAL MEDICINE

## 2018-06-07 ENCOUNTER — OFFICE VISIT (OUTPATIENT)
Dept: INTERNAL MEDICINE | Facility: CLINIC | Age: 28
End: 2018-06-07

## 2018-06-07 ENCOUNTER — HOSPITAL ENCOUNTER (OUTPATIENT)
Dept: GENERAL RADIOLOGY | Facility: HOSPITAL | Age: 28
Discharge: HOME | End: 2018-06-07
Attending: INTERNAL MEDICINE | Admitting: INTERNAL MEDICINE

## 2018-06-07 VITALS
SYSTOLIC BLOOD PRESSURE: 102 MMHG | DIASTOLIC BLOOD PRESSURE: 82 MMHG | TEMPERATURE: 98.3 F | HEART RATE: 69 BPM | OXYGEN SATURATION: 99 % | HEIGHT: 66 IN | WEIGHT: 210.2 LBS | BODY MASS INDEX: 33.78 KG/M2

## 2018-06-07 DIAGNOSIS — S13.4XXA WHIPLASH INJURIES, INITIAL ENCOUNTER: ICD-10-CM

## 2018-06-07 DIAGNOSIS — N93.9 VAGINAL BLEEDING: ICD-10-CM

## 2018-06-07 DIAGNOSIS — R51.9 ACUTE NONINTRACTABLE HEADACHE, UNSPECIFIED HEADACHE TYPE: ICD-10-CM

## 2018-06-07 DIAGNOSIS — M54.2 NECK PAIN, ACUTE: ICD-10-CM

## 2018-06-07 DIAGNOSIS — M25.512 ACUTE PAIN OF LEFT SHOULDER: ICD-10-CM
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
## 2018-06-07 PROCEDURE — 72050 X-RAY EXAM NECK SPINE 4/5VWS: CPT

## 2018-06-07 PROCEDURE — 99215 OFFICE O/P EST HI 40 MIN: CPT | Performed by: INTERNAL MEDICINE

## 2018-06-07 PROCEDURE — 73030 X-RAY EXAM OF SHOULDER: CPT

## 2018-06-07 RX ORDER — BACLOFEN 10 MG/1
10 TABLET ORAL 2 TIMES DAILY PRN
Qty: 30 TABLET | Refills: 0 | Status: SHIPPED | OUTPATIENT
Start: 2018-06-07 | End: 2018-06-25

## 2018-06-07 RX ORDER — TRAMADOL HYDROCHLORIDE 50 MG/1
50 TABLET ORAL EVERY 8 HOURS PRN
Qty: 20 TABLET | Refills: 0 | Status: SHIPPED | OUTPATIENT
Start: 2018-06-07 | End: 2018-09-28

## 2018-06-07 NOTE — PATIENT INSTRUCTIONS
Cervical Radiculopathy  Cervical radiculopathy happens when a nerve in the neck (cervical nerve) is pinched or bruised. This condition can develop because of an injury or as part of the normal aging process. Pressure on the cervical nerves can cause pain or numbness that runs from the neck all the way down into the arm and fingers. Usually, this condition gets better with rest. Treatment may be needed if the condition does not improve.  What are the causes?  This condition may be caused by:  · Injury.  · Slipped (herniated) disk.  · Muscle tightness in the neck because of overuse.  · Arthritis.  · Breakdown or degeneration in the bones and joints of the spine (spondylosis) due to aging.  · Bone spurs that may develop near the cervical nerves.  What are the signs or symptoms?  Symptoms of this condition include:  · Pain that runs from the neck to the arm and hand. The pain can be severe or irritating. It may be worse when the neck is moved.  · Numbness or weakness in the affected arm and hand.  How is this diagnosed?  This condition may be diagnosed based on symptoms, medical history, and a physical exam. You may also have tests, including:  · X-rays.  · CT scan.  · MRI.  · Electromyogram (EMG).  · Nerve conduction tests.  How is this treated?  In many cases, treatment is not needed for this condition. With rest, the condition usually gets better over time. If treatment is needed, options may include:  · Wearing a soft neck collar for short periods of time.  · Physical therapy to strengthen your neck muscles.  · Medicines, such as NSAIDs, oral corticosteroids, or spinal injections.  · Surgery. This may be needed if other treatments do not help. Various types of surgery may be done depending on the cause of your problems.  Follow these instructions at home:  Managing pain   · Take over-the-counter and prescription medicines only as told by your health care provider.  · If directed, apply ice to the affected  area.  ¨ Put ice in a plastic bag.  ¨ Place a towel between your skin and the bag.  ¨ Leave the ice on for 20 minutes, 2-3 times per day.  · If ice does not help, you can try using heat. Take a warm shower or warm bath, or use a heat pack as told by your health care provider.  · Try a gentle neck and shoulder massage to help relieve symptoms.  Activity   · Rest as needed. Follow instructions from your health care provider about any restrictions on activities.  · Do stretching and strengthening exercises as told by your health care provider or physical therapist.  General instructions   · If you were given a soft collar, wear it as told by your health care provider.  · Use a flat pillow when you sleep.  · Keep all follow-up visits as told by your health care provider. This is important.  Contact a health care provider if:  · Your condition does not improve with treatment.  Get help right away if:  · Your pain gets much worse and cannot be controlled with medicines.  · You have weakness or numbness in your hand, arm, face, or leg.  · You have a high fever.  · You have a stiff, rigid neck.  · You lose control of your bowels or your bladder (have incontinence).  · You have trouble with walking, balance, or speaking.  This information is not intended to replace advice given to you by your health care provider. Make sure you discuss any questions you have with your health care provider.  Document Released: 09/12/2002 Document Revised: 05/25/2017 Document Reviewed: 02/11/2016  [employer] Interactive Patient Education © 2017 [employer]    Concussion, Adult  A concussion is a brain injury from a direct hit (blow) to the head or body. This injury causes the brain to shake quickly back and forth inside the skull. It is caused by:  · A hit to the head.  · A quick and sudden movement (jolt) of the head or neck.  How fast you will get better from a concussion depends on many things like how bad your concussion was, what part of  your brain was hurt, how old you are, and how healthy you were before the concussion. Recovery can take time. It is important to wait to return to activity until a doctor says it is safe and your symptoms are all gone.  Follow these instructions at home:  Activity   · Limit activities that need a lot of thought or concentration. These include:  ¨ Homework or work for your job.  ¨ Watching TV.  ¨ Computer work.  ¨ Playing memory games and puzzles.  · Rest. Rest helps the brain to heal. Make sure you:  ¨ Get plenty of sleep at night. Do not stay up late.  ¨ Go to bed at the same time every day.  ¨ Rest during the day. Take naps or rest breaks when you feel tired.  · It can be dangerous if you get another concussion before the first one has healed Do not do activities that could cause a second concussion, such as riding a bike or playing sports.  · Ask your doctor when you can return to your normal activities, like driving, riding a bike, or using machinery. Your ability to react may be slower. Do not do these activities if you are dizzy. Your doctor will likely give you a plan for slowly going back to activities.  General instructions   · Take over-the-counter and prescription medicines only as told by your doctor.  · Do not drink alcohol until your doctor says you can.  · If it is harder than usual to remember things, write them down.  · If you are easily distracted, try to do one thing at a time. For example, do not try to watch TV while making dinner.  · Talk with family members or close friends when you need to make important decisions.  · Watch your symptoms and tell other people to do the same. Other problems (complications) can happen after a concussion. Older adults with a brain injury may have a higher risk of serious problems, such as a blood clot in the brain.  · Tell your teachers, school nurse, school counselor, , , or  about your injury and symptoms. Tell them about what  you can or cannot do. They should watch for:  ¨ More problems with attention or concentration.  ¨ More trouble remembering or learning new information.  ¨ More time needed to do tasks or assignments.  ¨ Being more annoyed (irritable) or having a harder time dealing with stress.  ¨ Any other symptoms that get worse.  · Keep all follow-up visits as told by your health care provider. This is important.  Prevention   · It is very important that you donot get another brain injury, especially before you have healed. In rare cases, another injury can cause permanent brain damage, brain swelling, or death. You have the most risk if you get another head injury in the first 7-10 days after you were hurt before. To avoid injuries:  ¨ Wear a seat belt when you ride in a car.  ¨ Do not drink too much alcohol.  ¨ Avoid activities that could make you get a second concussion, like contact sports.  ¨ Wear a helmet when you do activities like:  § Biking.  § Skiing.  § Skateboarding.  § Skating.  ¨ Make your home safe by:  § Removing things from the floor or stairs that could make you trip.  § Using grab bars in bathrooms and handrails by stairs.  § Placing non-slip mats on floors and in bathtubs.  § Putting more light in dark areas.  Contact a doctor if:  · Your symptoms get worse.  · You have new symptoms.  · You keep having symptoms for more than 2 weeks.  Get help right away if:  · You have bad headaches, or your headaches get worse.  · You have weakness in any part of your body.  · You have loss of feeling (numbness).  · You feel off balance.  · You keep throwing up (vomiting).  · You feel more sleepy.  · The black center of one eye (pupil) is bigger than the other one.  · You twitch or shake violently (convulse) or have a seizure.  · Your speech is not clear (is slurred).  · You feel more tired, more confused, or more annoyed.  · You do not recognize people or places.  · You have neck pain.  · It is hard to wake you up.  · You  have strange behavior changes.  · You pass out (lose consciousness).  Summary  · A concussion is a brain injury from a direct hit (blow) to the head or body.  · This condition is treated with rest and careful watching of symptoms.  · If you keep having symptoms for more than 2 weeks, call your doctor.  This information is not intended to replace advice given to you by your health care provider. Make sure you discuss any questions you have with your health care provider.  Document Released: 12/06/2010 Document Revised: 12/02/2017 Document Reviewed: 12/02/2017  [employer] Interactive Patient Education © 2017 [employer]
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
## 2018-06-08 LAB
ALBUMIN SERPL-MCNC: 4.6 G/DL (ref 3.5–5.2)
ALBUMIN/GLOB SERPL: 2.2 G/DL
ALP SERPL-CCNC: 63 U/L (ref 40–129)
ALT SERPL-CCNC: 15 U/L (ref 5–33)
APPEARANCE UR: CLEAR
AST SERPL-CCNC: 16 U/L (ref 5–32)
BACTERIA #/AREA URNS HPF: ABNORMAL /HPF
BASOPHILS # BLD AUTO: 0.03 10*3/MM3 (ref 0–0.2)
BASOPHILS NFR BLD AUTO: 0.4 % (ref 0–2)
BILIRUB SERPL-MCNC: 0.5 MG/DL (ref 0.2–1.2)
BILIRUB UR QL STRIP: NEGATIVE
BUN SERPL-MCNC: 8 MG/DL (ref 6–20)
BUN/CREAT SERPL: 14.8 (ref 7–25)
CALCIUM SERPL-MCNC: 9.3 MG/DL (ref 8.6–10.5)
CHLORIDE SERPL-SCNC: 103 MMOL/L (ref 98–107)
CO2 SERPL-SCNC: 24.7 MMOL/L (ref 22–29)
COLOR UR: YELLOW
CREAT SERPL-MCNC: 0.54 MG/DL (ref 0.57–1)
CRYSTALS URNS MICRO: ABNORMAL
EOSINOPHIL # BLD AUTO: 0.06 10*3/MM3 (ref 0.1–0.3)
EOSINOPHIL NFR BLD AUTO: 0.7 % (ref 0–4)
EPI CELLS #/AREA URNS HPF: ABNORMAL /HPF
ERYTHROCYTE [DISTWIDTH] IN BLOOD BY AUTOMATED COUNT: 14.7 % (ref 11.5–14.5)
GFR SERPLBLD CREATININE-BSD FMLA CKD-EPI: 134 ML/MIN/1.73
GFR SERPLBLD CREATININE-BSD FMLA CKD-EPI: >150 ML/MIN/1.73
GLOBULIN SER CALC-MCNC: 2.1 GM/DL
GLUCOSE SERPL-MCNC: 85 MG/DL (ref 65–99)
GLUCOSE UR QL: NEGATIVE
HCT VFR BLD AUTO: 41.2 % (ref 37–47)
HGB BLD-MCNC: 13.1 G/DL (ref 12–16)
HGB UR QL STRIP: ABNORMAL
IMM GRANULOCYTES # BLD: 0.03 10*3/MM3 (ref 0–0.03)
IMM GRANULOCYTES NFR BLD: 0.4 % (ref 0–0.5)
INR PPP: 1.03 (ref 0.9–1.1)
KETONES UR QL STRIP: ABNORMAL
LEUKOCYTE ESTERASE UR QL STRIP: NEGATIVE
LYMPHOCYTES # BLD AUTO: 1.93 10*3/MM3 (ref 0.6–4.8)
LYMPHOCYTES NFR BLD AUTO: 23.2 % (ref 20–45)
MCH RBC QN AUTO: 28.4 PG (ref 27–31)
MCHC RBC AUTO-ENTMCNC: 31.8 G/DL (ref 31–37)
MCV RBC AUTO: 89.4 FL (ref 81–99)
MICRO URNS: ABNORMAL
MONOCYTES # BLD AUTO: 0.53 10*3/MM3 (ref 0–1)
MONOCYTES NFR BLD AUTO: 6.4 % (ref 3–8)
MUCOUS THREADS URNS QL MICRO: PRESENT /HPF
NEUTROPHILS # BLD AUTO: 5.75 10*3/MM3 (ref 1.5–8.3)
NEUTROPHILS NFR BLD AUTO: 68.9 % (ref 45–70)
NITRITE UR QL STRIP: NEGATIVE
NRBC BLD AUTO-RTO: 0 /100 WBC (ref 0–0)
PH UR STRIP: 5.5 [PH] (ref 5–7.5)
PLATELET # BLD AUTO: 263 10*3/MM3 (ref 140–500)
POTASSIUM SERPL-SCNC: 4.3 MMOL/L (ref 3.5–5.2)
PROT SERPL-MCNC: 6.7 G/DL (ref 6–8.5)
PROT UR QL STRIP: ABNORMAL
PROTHROMBIN TIME: 13.5 SECONDS (ref 12.1–15)
RBC # BLD AUTO: 4.61 10*6/MM3 (ref 4.2–5.4)
RBC #/AREA URNS HPF: ABNORMAL /HPF
SODIUM SERPL-SCNC: 140 MMOL/L (ref 136–145)
SP GR UR: >=1.03 (ref 1–1.03)
UNIDENT CRYS URNS QL MICRO: PRESENT
URINALYSIS REFLEX: ABNORMAL
UROBILINOGEN UR STRIP-MCNC: 0.2 MG/DL (ref 0.2–1)
WBC # BLD AUTO: 8.33 10*3/MM3 (ref 4.8–10.8)
WBC #/AREA URNS HPF: ABNORMAL /HPF

## 2018-06-08 NOTE — PROGRESS NOTES
Labs looks good. She has some blood in her urine, but is due to her being on her period. Will recheck to make sure that this resolves when she returns.

## 2018-06-14 ENCOUNTER — OFFICE VISIT (OUTPATIENT)
Dept: INTERNAL MEDICINE | Facility: CLINIC | Age: 28
End: 2018-06-14

## 2018-06-14 VITALS
OXYGEN SATURATION: 99 % | HEART RATE: 86 BPM | WEIGHT: 209.6 LBS | BODY MASS INDEX: 33.68 KG/M2 | RESPIRATION RATE: 18 BRPM | HEIGHT: 66 IN | DIASTOLIC BLOOD PRESSURE: 82 MMHG | SYSTOLIC BLOOD PRESSURE: 114 MMHG | TEMPERATURE: 98.2 F

## 2018-06-14 DIAGNOSIS — B96.89 SKIN INFECTION, BACTERIAL: ICD-10-CM

## 2018-06-14 DIAGNOSIS — G44.329 CHRONIC POST-TRAUMATIC HEADACHE, NOT INTRACTABLE: ICD-10-CM

## 2018-06-14 DIAGNOSIS — F07.81 POST CONCUSSIVE SYNDROME: ICD-10-CM

## 2018-06-14 DIAGNOSIS — L08.9 SKIN INFECTION, BACTERIAL: ICD-10-CM
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
## 2018-06-14 PROCEDURE — 99214 OFFICE O/P EST MOD 30 MIN: CPT | Performed by: INTERNAL MEDICINE
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
## 2018-06-20 ENCOUNTER — APPOINTMENT (OUTPATIENT)
Dept: SLEEP MEDICINE | Facility: HOSPITAL | Age: 28
End: 2018-06-20
Attending: INTERNAL MEDICINE

## 2018-06-21 RX ORDER — VALACYCLOVIR HYDROCHLORIDE 1 G/1
TABLET, FILM COATED ORAL
Qty: 30 TABLET | Refills: 0 | Status: SHIPPED | OUTPATIENT
Start: 2018-06-21 | End: 2018-07-19 | Stop reason: SDUPTHER

## 2018-06-25 ENCOUNTER — OFFICE VISIT (OUTPATIENT)
Dept: RETAIL CLINIC | Facility: CLINIC | Age: 28
End: 2018-06-25

## 2018-06-25 VITALS
DIASTOLIC BLOOD PRESSURE: 82 MMHG | RESPIRATION RATE: 15 BRPM | OXYGEN SATURATION: 98 % | TEMPERATURE: 98.3 F | HEART RATE: 102 BPM | SYSTOLIC BLOOD PRESSURE: 118 MMHG

## 2018-06-25 DIAGNOSIS — J01.90 ACUTE SINUSITIS, RECURRENCE NOT SPECIFIED, UNSPECIFIED LOCATION: ICD-10-CM

## 2018-06-25 DIAGNOSIS — J02.9 ACUTE PHARYNGITIS, UNSPECIFIED ETIOLOGY: Primary | ICD-10-CM

## 2018-06-25 LAB
EXPIRATION DATE: NORMAL
INTERNAL CONTROL: NORMAL
Lab: NORMAL
S PYO AG THROAT QL: NEGATIVE

## 2018-06-25 PROCEDURE — 99213 OFFICE O/P EST LOW 20 MIN: CPT | Performed by: NURSE PRACTITIONER

## 2018-06-25 PROCEDURE — 87880 STREP A ASSAY W/OPTIC: CPT | Performed by: NURSE PRACTITIONER

## 2018-06-25 RX ORDER — CEFDINIR 300 MG/1
300 CAPSULE ORAL 2 TIMES DAILY
Qty: 14 CAPSULE | Refills: 0 | Status: SHIPPED | OUTPATIENT
Start: 2018-06-25 | End: 2018-07-02

## 2018-06-25 RX ORDER — FLUTICASONE PROPIONATE 50 MCG
1 SPRAY, SUSPENSION (ML) NASAL 2 TIMES DAILY
Qty: 1 BOTTLE | Refills: 0 | Status: SHIPPED | OUTPATIENT
Start: 2018-06-25 | End: 2018-07-09

## 2018-06-25 RX ORDER — BROMPHENIRAMINE MALEATE, PSEUDOEPHEDRINE HYDROCHLORIDE, AND DEXTROMETHORPHAN HYDROBROMIDE 2; 30; 10 MG/5ML; MG/5ML; MG/5ML
SYRUP ORAL
Qty: 300 ML | Refills: 0 | Status: SHIPPED | OUTPATIENT
Start: 2018-06-25 | End: 2018-09-28

## 2018-06-25 NOTE — PROGRESS NOTES
Subjective     Oriana Mina is a 28 y.o.. female.     Sore Throat    This is a new problem. Episode onset: one week. The problem has been unchanged. There has been no fever. Associated symptoms include congestion, coughing, ear pain (left) and headaches. Pertinent negatives include no vomiting. Treatments tried: mucinex, otc cough/cold, ibuprofen, claritin, tylenol. The treatment provided no relief.   Cough   Associated symptoms include ear pain (left), headaches, rhinorrhea and a sore throat. Pertinent negatives include no fever.   Earache    Associated symptoms include coughing, headaches, rhinorrhea and a sore throat. Pertinent negatives include no vomiting.   Sinus Problem   Associated symptoms include congestion, coughing, ear pain (left), headaches, sinus pressure and a sore throat.       The following portions of the patient's history were reviewed and updated as appropriate: allergies, current medications, past family history, past medical history, past social history, past surgical history and problem list.    Review of Systems   Constitutional: Negative for fever.   HENT: Positive for congestion, ear pain (left), rhinorrhea, sinus pressure and sore throat.    Respiratory: Positive for cough.    Gastrointestinal: Negative for nausea and vomiting.        Has hx of IBS   Neurological: Positive for headaches.       Objective     Vitals:    06/25/18 1206   BP: 118/82   BP Location: Left arm   Patient Position: Sitting   Cuff Size: Adult   Pulse: 102   Resp: 15   Temp: 98.3 °F (36.8 °C)   TempSrc: Oral   SpO2: 98%       Physical Exam   Constitutional: She is oriented to person, place, and time. She appears well-developed and well-nourished.   HENT:   Head: Normocephalic and atraumatic.   Right Ear: Tympanic membrane normal. There is drainage (clear). Tympanic membrane is not erythematous.   Left Ear: Tympanic membrane normal. There is drainage (clear). Tympanic membrane is not erythematous.   Nose:  Mucosal edema present. Right sinus exhibits maxillary sinus tenderness and frontal sinus tenderness. Left sinus exhibits maxillary sinus tenderness and frontal sinus tenderness.   Mouth/Throat: Posterior oropharyngeal erythema present. Oropharyngeal exudate: pnd.   Eyes: Conjunctivae are normal. Pupils are equal, round, and reactive to light.   Cardiovascular: Normal rate and regular rhythm.    No murmur heard.  Pulmonary/Chest: Effort normal. She has no wheezes. She has no rhonchi. She has no rales.   Musculoskeletal: Normal range of motion.   Lymphadenopathy:     She has cervical adenopathy.   Neurological: She is alert and oriented to person, place, and time.   Skin: Skin is warm and dry.   Vitals reviewed.      Lab Results (last 24 hours)     Procedure Component Value Units Date/Time    POC Rapid Strep A [[id number]]  (Normal) Collected:  06/25/18 1230    Specimen:  Swab Updated:  06/25/18 1231     Rapid Strep A Screen Negative     Internal Control Passed     Lot Number [id number]     Expiration Date 2019-12-31          Assessment/Plan   Oriana was seen today for sore throat, cough, earache and sinus problem.    Diagnoses and all orders for this visit:    Acute pharyngitis, unspecified etiology  -     POC Rapid Strep A    Acute sinusitis, recurrence not specified, unspecified location  -     cefdinir (OMNICEF) 300 MG capsule; Take 1 capsule by mouth 2 (Two) Times a Day for 7 days.  -     fluticasone (FLONASE) 50 MCG/ACT nasal spray; 1 spray into each nostril 2 (Two) Times a Day for 14 days.  -     brompheniramine-pseudoephedrine-DM (BROMFED DM) 30-2-10 MG/5ML syrup; 10 ml po three times a day for 10 days as needed for cough, congestion        Patient Instructions   Sinusitis, Adult  Sinusitis is soreness and inflammation of your sinuses. Sinuses are hollow spaces in the bones around your face. Your sinuses are located:  · Around your eyes.  · In the middle of your forehead.  · Behind your nose.  · In your  cheekbones.    Your sinuses and nasal passages are lined with a stringy fluid (mucus). Mucus normally drains out of your sinuses. When your nasal tissues become inflamed or swollen, the mucus can become trapped or blocked so air cannot flow through your sinuses. This allows bacteria, viruses, and funguses to grow, which leads to infection.  Sinusitis can develop quickly and last for 7?10 days (acute) or for more than 12 weeks (chronic). Sinusitis often develops after a cold.  What are the causes?  This condition is caused by anything that creates swelling in the sinuses or stops mucus from draining, including:  · Allergies.  · Asthma.  · Bacterial or viral infection.  · Abnormally shaped bones between the nasal passages.  · Nasal growths that contain mucus (nasal polyps).  · Narrow sinus openings.  · Pollutants, such as chemicals or irritants in the air.  · A foreign object stuck in the nose.  · A fungal infection. This is rare.    What increases the risk?  The following factors may make you more likely to develop this condition:  · Having allergies or asthma.  · Having had a recent cold or respiratory tract infection.  · Having structural deformities or blockages in your nose or sinuses.  · Having a weak immune system.  · Doing a lot of swimming or diving.  · Overusing nasal sprays.  · Smoking.    What are the signs or symptoms?  The main symptoms of this condition are pain and a feeling of pressure around the affected sinuses. Other symptoms include:  · Upper toothache.  · Earache.  · Headache.  · Bad breath.  · Decreased sense of smell and taste.  · A cough that may get worse at night.  · Fatigue.  · Fever.  · Thick drainage from your nose. The drainage is often green and it may contain pus (purulent).  · Stuffy nose or congestion.  · Postnasal drip. This is when extra mucus collects in the throat or back of the nose.  · Swelling and warmth over the affected sinuses.  · Sore throat.  · Sensitivity to  light.    How is this diagnosed?  This condition is diagnosed based on symptoms, a medical history, and a physical exam. To find out if your condition is acute or chronic, your health care provider may:  · Look in your nose for signs of nasal polyps.  · Tap over the affected sinus to check for signs of infection.  · View the inside of your sinuses using an imaging device that has a light attached (endoscope).    If your health care provider suspects that you have chronic sinusitis, you may also:  · Be tested for allergies.  · Have a sample of mucus taken from your nose (nasal culture) and checked for bacteria.  · Have a mucus sample examined to see if your sinusitis is related to an allergy.    If your sinusitis does not respond to treatment and it lasts longer than 8 weeks, you may have an MRI or CT scan to check your sinuses. These scans also help to determine how severe your infection is.  In rare cases, a bone biopsy may be done to rule out more serious types of fungal sinus disease.  How is this treated?  Treatment for sinusitis depends on the cause and whether your condition is chronic or acute. If a virus is causing your sinusitis, your symptoms will go away on their own within 10 days. You may be given medicines to relieve your symptoms, including:  · Topical nasal decongestants. They shrink swollen nasal passages and let mucus drain from your sinuses.  · Antihistamines. These drugs block inflammation that is triggered by allergies. This can help to ease swelling in your nose and sinuses.  · Topical nasal corticosteroids. These are nasal sprays that ease inflammation and swelling in your nose and sinuses.  · Nasal saline washes. These rinses can help to get rid of thick mucus in your nose.    If your condition is caused by bacteria, you will be given an antibiotic medicine. If your condition is caused by a fungus, you will be given an antifungal medicine.  Surgery may be needed to correct underlying  conditions, such as narrow nasal passages. Surgery may also be needed to remove polyps.  Follow these instructions at home:  Medicines  · Take, use, or apply over-the-counter and prescription medicines only as told by your health care provider. These may include nasal sprays.  · If you were prescribed an antibiotic medicine, take it as told by your health care provider. Do not stop taking the antibiotic even if you start to feel better.  Hydrate and Humidify  · Drink enough water to keep your urine clear or pale yellow. Staying hydrated will help to thin your mucus.  · Use a cool mist humidifier to keep the humidity level in your home above 50%.  · Inhale steam for 10-15 minutes, 3-4 times a day or as told by your health care provider. You can do this in the bathroom while a hot shower is running.  · Limit your exposure to cool or dry air.  Rest  · Rest as much as possible.  · Sleep with your head raised (elevated).  · Make sure to get enough sleep each night.  General instructions  · Apply a warm, moist washcloth to your face 3-4 times a day or as told by your health care provider. This will help with discomfort.  · Wash your hands often with soap and water to reduce your exposure to viruses and other germs. If soap and water are not available, use hand .  · Do not smoke. Avoid being around people who are smoking (secondhand smoke).  · Keep all follow-up visits as told by your health care provider. This is important.  Contact a health care provider if:  · You have a fever.  · Your symptoms get worse.  · Your symptoms do not improve within 10 days.  Get help right away if:  · You have a severe headache.  · You have persistent vomiting.  · You have pain or swelling around your face or eyes.  · You have vision problems.  · You develop confusion.  · Your neck is stiff.  · You have trouble breathing.  This information is not intended to replace advice given to you by your health care provider. Make sure you  discuss any questions you have with your health care provider.  Document Released: 12/18/2006 Document Revised: 08/13/2017 Document Reviewed: 10/12/2016  [employer] [place] Interactive Patient Education © 2018 [employer]        Return if symptoms worsen or fail to improve with urgent care/ER.

## 2018-06-25 NOTE — PATIENT INSTRUCTIONS

## 2018-07-19 RX ORDER — VALACYCLOVIR HYDROCHLORIDE 1 G/1
TABLET, FILM COATED ORAL
Qty: 30 TABLET | Refills: 0 | Status: SHIPPED | OUTPATIENT
Start: 2018-07-19 | End: 2019-11-21

## 2018-09-19 DIAGNOSIS — E78.00 PURE HYPERCHOLESTEROLEMIA: Primary | ICD-10-CM

## 2018-09-19 DIAGNOSIS — E03.8 OTHER SPECIFIED HYPOTHYROIDISM: ICD-10-CM

## 2018-09-19 DIAGNOSIS — R53.83 FATIGUE, UNSPECIFIED TYPE: ICD-10-CM

## 2018-09-22 ENCOUNTER — RESULTS ENCOUNTER (OUTPATIENT)
Dept: INTERNAL MEDICINE | Facility: CLINIC | Age: 28
End: 2018-09-22

## 2018-09-22 DIAGNOSIS — E03.8 OTHER SPECIFIED HYPOTHYROIDISM: ICD-10-CM

## 2018-09-22 DIAGNOSIS — R53.83 FATIGUE, UNSPECIFIED TYPE: ICD-10-CM

## 2018-09-22 DIAGNOSIS — E78.00 PURE HYPERCHOLESTEROLEMIA: ICD-10-CM

## 2018-09-22 LAB
25(OH)D3+25(OH)D2 SERPL-MCNC: 33.9 NG/ML
ALBUMIN SERPL-MCNC: 4.5 G/DL (ref 3.5–5.2)
ALBUMIN/GLOB SERPL: 1.7 G/DL
ALP SERPL-CCNC: 81 U/L (ref 40–129)
ALT SERPL-CCNC: 15 U/L (ref 5–33)
APPEARANCE UR: CLEAR
AST SERPL-CCNC: 14 U/L (ref 5–32)
BACTERIA #/AREA URNS HPF: ABNORMAL /HPF
BASOPHILS # BLD AUTO: 0.03 10*3/MM3 (ref 0–0.2)
BASOPHILS NFR BLD AUTO: 0.3 % (ref 0–2)
BILIRUB SERPL-MCNC: 0.3 MG/DL (ref 0.2–1.2)
BILIRUB UR QL STRIP: NEGATIVE
BUN SERPL-MCNC: 7 MG/DL (ref 6–20)
BUN/CREAT SERPL: 13.2 (ref 7–25)
CALCIUM SERPL-MCNC: 8.7 MG/DL (ref 8.6–10.5)
CASTS URNS MICRO: ABNORMAL
CASTS URNS QL MICRO: PRESENT /LPF
CHLORIDE SERPL-SCNC: 105 MMOL/L (ref 98–107)
CHOLEST SERPL-MCNC: 175 MG/DL (ref 0–200)
CO2 SERPL-SCNC: 25.6 MMOL/L (ref 22–29)
COLOR UR: YELLOW
CREAT SERPL-MCNC: 0.53 MG/DL (ref 0.57–1)
EOSINOPHIL # BLD AUTO: 0.13 10*3/MM3 (ref 0.1–0.3)
EOSINOPHIL NFR BLD AUTO: 1.4 % (ref 0–4)
EPI CELLS #/AREA URNS HPF: ABNORMAL /HPF
ERYTHROCYTE [DISTWIDTH] IN BLOOD BY AUTOMATED COUNT: 13.8 % (ref 11.5–14.5)
GLOBULIN SER CALC-MCNC: 2.6 GM/DL
GLUCOSE SERPL-MCNC: 80 MG/DL (ref 65–99)
GLUCOSE UR QL: NEGATIVE
HCT VFR BLD AUTO: 39.2 % (ref 37–47)
HDLC SERPL-MCNC: 37 MG/DL (ref 40–60)
HGB BLD-MCNC: 12.5 G/DL (ref 12–16)
HGB UR QL STRIP: NEGATIVE
IMM GRANULOCYTES # BLD: 0.03 10*3/MM3 (ref 0–0.03)
IMM GRANULOCYTES NFR BLD: 0.3 % (ref 0–0.5)
KETONES UR QL STRIP: NEGATIVE
LDLC SERPL CALC-MCNC: 107 MG/DL (ref 0–100)
LDLC/HDLC SERPL: 2.89 {RATIO}
LEUKOCYTE ESTERASE UR QL STRIP: NEGATIVE
LYMPHOCYTES # BLD AUTO: 2.28 10*3/MM3 (ref 0.6–4.8)
LYMPHOCYTES NFR BLD AUTO: 24.2 % (ref 20–45)
MCH RBC QN AUTO: 28.2 PG (ref 27–31)
MCHC RBC AUTO-ENTMCNC: 31.9 G/DL (ref 31–37)
MCV RBC AUTO: 88.3 FL (ref 81–99)
MICRO URNS: NORMAL
MICRO URNS: NORMAL
MONOCYTES # BLD AUTO: 0.57 10*3/MM3 (ref 0–1)
MONOCYTES NFR BLD AUTO: 6.1 % (ref 3–8)
MUCOUS THREADS URNS QL MICRO: PRESENT /HPF
NEUTROPHILS # BLD AUTO: 6.37 10*3/MM3 (ref 1.5–8.3)
NEUTROPHILS NFR BLD AUTO: 67.7 % (ref 45–70)
NITRITE UR QL STRIP: NEGATIVE
NRBC BLD AUTO-RTO: 0 /100 WBC (ref 0–0)
PH UR STRIP: 6 [PH] (ref 5–7.5)
PLATELET # BLD AUTO: 279 10*3/MM3 (ref 140–500)
POTASSIUM SERPL-SCNC: 4 MMOL/L (ref 3.5–5.2)
PROT SERPL-MCNC: 7.1 G/DL (ref 6–8.5)
PROT UR QL STRIP: NEGATIVE
RBC # BLD AUTO: 4.44 10*6/MM3 (ref 4.2–5.4)
RBC #/AREA URNS HPF: ABNORMAL /HPF
SODIUM SERPL-SCNC: 140 MMOL/L (ref 136–145)
SP GR UR: 1.02 (ref 1–1.03)
T4 FREE SERPL-MCNC: 1.53 NG/DL (ref 0.93–1.7)
TRIGL SERPL-MCNC: 156 MG/DL (ref 0–150)
TSH SERPL DL<=0.005 MIU/L-ACNC: 2.1 MIU/ML (ref 0.27–4.2)
URINALYSIS REFLEX: NORMAL
UROBILINOGEN UR STRIP-MCNC: 0.2 MG/DL (ref 0.2–1)
VLDLC SERPL CALC-MCNC: 31.2 MG/DL (ref 7–27)
WBC # BLD AUTO: 9.41 10*3/MM3 (ref 4.8–10.8)
WBC #/AREA URNS HPF: ABNORMAL /HPF

## 2018-09-28 ENCOUNTER — OFFICE VISIT (OUTPATIENT)
Dept: INTERNAL MEDICINE | Facility: CLINIC | Age: 28
End: 2018-09-28

## 2018-09-28 VITALS
HEART RATE: 74 BPM | RESPIRATION RATE: 18 BRPM | SYSTOLIC BLOOD PRESSURE: 120 MMHG | HEIGHT: 66 IN | DIASTOLIC BLOOD PRESSURE: 70 MMHG | BODY MASS INDEX: 34.87 KG/M2 | OXYGEN SATURATION: 98 % | WEIGHT: 217 LBS

## 2018-09-28 DIAGNOSIS — R63.5 WEIGHT GAIN: ICD-10-CM

## 2018-09-28 DIAGNOSIS — E03.8 OTHER SPECIFIED HYPOTHYROIDISM: ICD-10-CM

## 2018-09-28 DIAGNOSIS — E78.2 MIXED HYPERLIPIDEMIA: Primary | ICD-10-CM

## 2018-09-28 DIAGNOSIS — R45.4 IRRITABLE BEHAVIOR: ICD-10-CM

## 2018-09-28 DIAGNOSIS — E66.9 OBESITY (BMI 30-39.9): ICD-10-CM

## 2018-09-28 PROCEDURE — 99214 OFFICE O/P EST MOD 30 MIN: CPT | Performed by: INTERNAL MEDICINE

## 2018-09-28 RX ORDER — BUPROPION HYDROCHLORIDE 150 MG/1
150 TABLET ORAL DAILY
Qty: 30 TABLET | Refills: 1 | Status: SHIPPED | OUTPATIENT
Start: 2018-09-28 | End: 2018-11-08 | Stop reason: SDUPTHER

## 2018-09-28 NOTE — PROGRESS NOTES
Oriana Mina is a 28 y.o. female, who presents with a chief complaint of   Chief Complaint   Patient presents with   • Follow-up     4 month f/u, lab results    • Hypothyroidism   • Weight Check       29 yo F here for follow up of her weight and thyroid.    She has gained more weight. She is exercising everyday and eating a very little and when she is eating, she is eating mostly vegetables, leans proteins, no carbs and water. She is avoiding alcohol. Her lowest weight in the last 2 years has been around 198lbs. She does want to always snack. She gained weight on Seroquel in the past.    She has chronic hypothyroidism that is under good control and is doing well. She feels well on this.     Her HLD is under fair control. She is eating well on most days.          The following portions of the patient's history were reviewed and updated as appropriate: allergies, current medications, past family history, past medical history, past social history, past surgical history and problem list.    Allergies: Sulfa antibiotics    Current Outpatient Prescriptions:   •  BIOTIN PO, Take  by mouth Daily., Disp: , Rfl:   •  Cholecalciferol (VITAMIN D3 PO), Take  by mouth Daily., Disp: , Rfl:   •  Cyanocobalamin (VITAMIN B-12 PO), Take  by mouth Daily., Disp: , Rfl:   •  Etonogestrel (NEXPLANON) 68 MG implant subdermal implant, Inject 1 each into the skin 1 (One) Time., Disp: , Rfl:   •  levothyroxine (SYNTHROID, LEVOTHROID) 100 MCG tablet, Take 1 tablet by mouth Daily., Disp: 30 tablet, Rfl: 6  •  Loratadine (CLARITIN PO), Take  by mouth Daily., Disp: , Rfl:   •  Probiotic Product (PRO-BIOTIC BLEND PO), Take  by mouth Daily., Disp: , Rfl:   •  valACYclovir (VALTREX) 1000 MG tablet, TAKE 1 TABLET EVERY DAY FOR 3 DAYS WHEN YOU HAVE AN OUTBREAK, Disp: 30 tablet, Rfl: 0  •  buPROPion XL (WELLBUTRIN XL) 150 MG 24 hr tablet, Take 1 tablet by mouth Daily., Disp: 30 tablet, Rfl: 1  Medications Discontinued During This Encounter  "  Medication Reason   • brompheniramine-pseudoephedrine-DM (BROMFED DM) 30-2-10 MG/5ML syrup *Therapy completed   • traMADol (ULTRAM) 50 MG tablet *Therapy completed       Review of Systems   Constitutional: Positive for unexpected weight change. Negative for chills, fatigue and fever.   HENT: Negative for congestion.    Respiratory: Negative for cough.    Genitourinary: Negative for difficulty urinating and dysuria.   Neurological: Negative for dizziness and headaches.             /70 (BP Location: Left arm, Patient Position: Sitting, Cuff Size: Large Adult)   Pulse 74   Resp 18   Ht 167.6 cm (65.98\")   Wt 98.4 kg (217 lb)   SpO2 98%   Breastfeeding? No   BMI 35.04 kg/m²       Physical Exam   Constitutional: She is oriented to person, place, and time. She appears well-developed and well-nourished. No distress.   HENT:   Head: Normocephalic and atraumatic.   Right Ear: External ear normal.   Left Ear: External ear normal.   Mouth/Throat: Oropharynx is clear and moist. No oropharyngeal exudate.   Eyes: Conjunctivae are normal. Right eye exhibits no discharge. Left eye exhibits no discharge. No scleral icterus.   Neck: Neck supple.   Pulmonary/Chest: Effort normal.   Lymphadenopathy:     She has no cervical adenopathy.   Neurological: She is alert and oriented to person, place, and time. She exhibits normal muscle tone. Coordination normal.   Skin: Skin is warm. Capillary refill takes less than 2 seconds. No rash noted.   Psychiatric: She has a normal mood and affect. Her behavior is normal.   Nursing note and vitals reviewed.        Results for orders placed or performed in visit on 09/22/18   Comprehensive metabolic panel   Result Value Ref Range    Glucose 80 65 - 99 mg/dL    BUN 7 6 - 20 mg/dL    Creatinine 0.53 (L) 0.57 - 1.00 mg/dL    eGFR Non African Am 137 >60 mL/min/1.73    eGFR African Am >150 >60 mL/min/1.73    BUN/Creatinine Ratio 13.2 7.0 - 25.0    Sodium 140 136 - 145 mmol/L    Potassium 4.0 " 3.5 - 5.2 mmol/L    Chloride 105 98 - 107 mmol/L    Total CO2 25.6 22.0 - 29.0 mmol/L    Calcium 8.7 8.6 - 10.5 mg/dL    Total Protein 7.1 6.0 - 8.5 g/dL    Albumin 4.50 3.50 - 5.20 g/dL    Globulin 2.6 gm/dL    A/G Ratio 1.7 g/dL    Total Bilirubin 0.3 0.2 - 1.2 mg/dL    Alkaline Phosphatase 81 40 - 129 U/L    AST (SGOT) 14 5 - 32 U/L    ALT (SGPT) 15 5 - 33 U/L   Lipid Panel With LDL/HDL Ratio   Result Value Ref Range    Total Cholesterol 175 0 - 200 mg/dL    Triglycerides 156 (H) 0 - 150 mg/dL    HDL Cholesterol 37 (L) 40 - 60 mg/dL    VLDL Cholesterol 31.2 (H) 7 - 27 mg/dL    LDL Cholesterol  107 (H) 0 - 100 mg/dL    LDL/HDL Ratio 2.89    Urinalysis With Culture If Indicated - Urine, Clean Catch   Result Value Ref Range    Specific Gravity, UA 1.021 1.005 - 1.030    pH, UA 6.0 5.0 - 7.5    Color, UA Yellow Yellow    Appearance, UA Clear Clear    Leukocytes, UA Negative Negative    Protein Negative Negative/Trace    Glucose, UA Negative Negative    Ketones Negative Negative    Blood, UA Negative Negative    Bilirubin, UA Negative Negative    Urobilinogen, UA 0.2 0.2 - 1.0 mg/dL    Nitrite, UA Negative Negative    Microscopic Examination Comment     MICROSCOPIC EXAMINATION See below:     Urinalysis Reflex Comment    TSH   Result Value Ref Range    TSH 2.100 0.270 - 4.200 mIU/mL   T4, free   Result Value Ref Range    Free T4 1.53 0.93 - 1.70 ng/dL   Vitamin D 25 hydroxy   Result Value Ref Range    25 Hydroxy, Vitamin D 33.9 ng/ml   Microscopic Examination   Result Value Ref Range    WBC, UA 0-5 0 - 5 /hpf    RBC, UA 0-2 0 - 2 /hpf    Epithelial Cells (non renal) 0-10 0 - 10 /hpf    Casts Present (A) None seen /lpf    Cast Type Granular casts (A) N/A    Mucus, UA Present Not Estab. /hpf    Bacteria, UA Few None seen/Few /hpf   CBC & Differential   Result Value Ref Range    WBC 9.41 4.80 - 10.80 10*3/mm3    RBC 4.44 4.20 - 5.40 10*6/mm3    Hemoglobin 12.5 12.0 - 16.0 g/dL    Hematocrit 39.2 37.0 - 47.0 %    MCV 88.3  81.0 - 99.0 fL    MCH 28.2 27.0 - 31.0 pg    MCHC 31.9 31.0 - 37.0 g/dL    RDW 13.8 11.5 - 14.5 %    Platelets 279 140 - 500 10*3/mm3    Neutrophil Rel % 67.7 45.0 - 70.0 %    Lymphocyte Rel % 24.2 20.0 - 45.0 %    Monocyte Rel % 6.1 3.0 - 8.0 %    Eosinophil Rel % 1.4 0.0 - 4.0 %    Basophil Rel % 0.3 0.0 - 2.0 %    Neutrophils Absolute 6.37 1.50 - 8.30 10*3/mm3    Lymphocytes Absolute 2.28 0.60 - 4.80 10*3/mm3    Monocytes Absolute 0.57 0.00 - 1.00 10*3/mm3    Eosinophils Absolute 0.13 0.10 - 0.30 10*3/mm3    Basophils Absolute 0.03 0.00 - 0.20 10*3/mm3    Immature Granulocyte Rel % 0.3 0.0 - 0.5 %    Immature Grans Absolute 0.03 0.00 - 0.03 10*3/mm3    nRBC 0.0 0.0 - 0.0 /100 WBC           Oriana was seen today for follow-up, hypothyroidism and weight check.    Diagnoses and all orders for this visit:    Mixed hyperlipidemia    Obesity (BMI 30-39.9)    Other specified hypothyroidism    Weight gain    Irritable behavior    Other orders  -     buPROPion XL (WELLBUTRIN XL) 150 MG 24 hr tablet; Take 1 tablet by mouth Daily.      Patient's cholesterol is under fair control. Will continue current regimen of diet and exercise. Will follow up in 6 months to monitor levels.     TSH has been stable and patient is doing well. Will continue current regimen of levothyroxine 100 mcg and follow up levels in 6 months.     Start Wellbutrin for her irritable mood and to help with her weight and cravings. Will see back in 6 weeks.     Return in about 6 weeks (around 11/9/2018) for Recheck.    Tara Jimenes MD  09/28/2018

## 2018-11-08 ENCOUNTER — OFFICE VISIT (OUTPATIENT)
Dept: INTERNAL MEDICINE | Facility: CLINIC | Age: 28
End: 2018-11-08

## 2018-11-08 VITALS
OXYGEN SATURATION: 98 % | BODY MASS INDEX: 35.52 KG/M2 | WEIGHT: 221 LBS | HEIGHT: 66 IN | SYSTOLIC BLOOD PRESSURE: 122 MMHG | DIASTOLIC BLOOD PRESSURE: 64 MMHG | RESPIRATION RATE: 18 BRPM | TEMPERATURE: 98.4 F | HEART RATE: 97 BPM

## 2018-11-08 DIAGNOSIS — R63.2 OVEREATING: ICD-10-CM

## 2018-11-08 DIAGNOSIS — E66.9 OBESITY (BMI 30-39.9): Primary | ICD-10-CM

## 2018-11-08 PROCEDURE — 99214 OFFICE O/P EST MOD 30 MIN: CPT | Performed by: INTERNAL MEDICINE

## 2018-11-08 RX ORDER — BUPROPION HYDROCHLORIDE 300 MG/1
300 TABLET ORAL DAILY
Qty: 30 TABLET | Refills: 6 | Status: SHIPPED | OUTPATIENT
Start: 2018-11-08 | End: 2019-06-20

## 2018-11-08 RX ORDER — BUPROPION HYDROCHLORIDE 300 MG/1
300 TABLET ORAL DAILY
Qty: 30 TABLET | Refills: 6 | Status: SHIPPED | OUTPATIENT
Start: 2018-11-08 | End: 2018-11-08 | Stop reason: SDUPTHER

## 2018-11-08 NOTE — PROGRESS NOTES
Oriana Mina is a 28 y.o. female, who presents with a chief complaint of   Chief Complaint   Patient presents with   • Follow-up     6 week f/u    • Irritable     rx check        27 yo F here for follow up of her over eating and weight. She started Wellbutrin about 6 weeks ago and it did help with her cravings, but has now noticed that she is snacking more.     Her mood is better as well. Sleeping good. No other concerns about the medication. She is exercising with weights on [employer]. They are high intensity. She is walking about 2 miles per day.          The following portions of the patient's history were reviewed and updated as appropriate: allergies, current medications, past family history, past medical history, past social history, past surgical history and problem list.    Allergies: Sulfa antibiotics    Current Outpatient Prescriptions:   •  BIOTIN PO, Take  by mouth Daily., Disp: , Rfl:   •  buPROPion XL (WELLBUTRIN XL) 300 MG 24 hr tablet, Take 1 tablet by mouth Daily., Disp: 30 tablet, Rfl: 6  •  Cholecalciferol (VITAMIN D3 PO), Take  by mouth Daily., Disp: , Rfl:   •  Cyanocobalamin (VITAMIN B-12 PO), Take  by mouth Daily., Disp: , Rfl:   •  Etonogestrel (NEXPLANON) 68 MG implant subdermal implant, Inject 1 each into the skin 1 (One) Time., Disp: , Rfl:   •  levothyroxine (SYNTHROID, LEVOTHROID) 100 MCG tablet, Take 1 tablet by mouth Daily., Disp: 30 tablet, Rfl: 6  •  Loratadine (CLARITIN PO), Take  by mouth Daily., Disp: , Rfl:   •  Probiotic Product (PRO-BIOTIC BLEND PO), Take  by mouth Daily., Disp: , Rfl:   •  valACYclovir (VALTREX) 1000 MG tablet, TAKE 1 TABLET EVERY DAY FOR 3 DAYS WHEN YOU HAVE AN OUTBREAK, Disp: 30 tablet, Rfl: 0  Medications Discontinued During This Encounter   Medication Reason   • buPROPion XL (WELLBUTRIN XL) 150 MG 24 hr tablet Reorder       Review of Systems   Constitutional: Negative for chills and fatigue.   Respiratory: Negative for cough and shortness of  "breath.    Neurological: Negative for dizziness and headaches.             /64 (BP Location: Left arm, Patient Position: Sitting, Cuff Size: Large Adult)   Pulse 97   Temp 98.4 °F (36.9 °C) (Oral)   Resp 18   Ht 167.6 cm (65.98\")   Wt 100 kg (221 lb)   SpO2 98%   BMI 35.69 kg/m²       Physical Exam   Constitutional: She is oriented to person, place, and time. She appears well-developed and well-nourished. No distress.   HENT:   Head: Normocephalic and atraumatic.   Right Ear: External ear normal.   Left Ear: External ear normal.   Mouth/Throat: Oropharynx is clear and moist. No oropharyngeal exudate.   Eyes: Conjunctivae are normal. Right eye exhibits no discharge. Left eye exhibits no discharge. No scleral icterus.   Neck: Neck supple.   Pulmonary/Chest: Effort normal.   Lymphadenopathy:     She has no cervical adenopathy.   Neurological: She is alert and oriented to person, place, and time.   Skin: Skin is warm. No rash noted.   Psychiatric: She has a normal mood and affect. Her behavior is normal.   Nursing note and vitals reviewed.        Results for orders placed or performed in visit on 09/22/18   Comprehensive metabolic panel   Result Value Ref Range    Glucose 80 65 - 99 mg/dL    BUN 7 6 - 20 mg/dL    Creatinine 0.53 (L) 0.57 - 1.00 mg/dL    eGFR Non African Am 137 >60 mL/min/1.73    eGFR African Am >150 >60 mL/min/1.73    BUN/Creatinine Ratio 13.2 7.0 - 25.0    Sodium 140 136 - 145 mmol/L    Potassium 4.0 3.5 - 5.2 mmol/L    Chloride 105 98 - 107 mmol/L    Total CO2 25.6 22.0 - 29.0 mmol/L    Calcium 8.7 8.6 - 10.5 mg/dL    Total Protein 7.1 6.0 - 8.5 g/dL    Albumin 4.50 3.50 - 5.20 g/dL    Globulin 2.6 gm/dL    A/G Ratio 1.7 g/dL    Total Bilirubin 0.3 0.2 - 1.2 mg/dL    Alkaline Phosphatase 81 40 - 129 U/L    AST (SGOT) 14 5 - 32 U/L    ALT (SGPT) 15 5 - 33 U/L   Lipid Panel With LDL/HDL Ratio   Result Value Ref Range    Total Cholesterol 175 0 - 200 mg/dL    Triglycerides 156 (H) 0 - 150 " mg/dL    HDL Cholesterol 37 (L) 40 - 60 mg/dL    VLDL Cholesterol 31.2 (H) 7 - 27 mg/dL    LDL Cholesterol  107 (H) 0 - 100 mg/dL    LDL/HDL Ratio 2.89    Urinalysis With Culture If Indicated - Urine, Clean Catch   Result Value Ref Range    Specific Gravity, UA 1.021 1.005 - 1.030    pH, UA 6.0 5.0 - 7.5    Color, UA Yellow Yellow    Appearance, UA Clear Clear    Leukocytes, UA Negative Negative    Protein Negative Negative/Trace    Glucose, UA Negative Negative    Ketones Negative Negative    Blood, UA Negative Negative    Bilirubin, UA Negative Negative    Urobilinogen, UA 0.2 0.2 - 1.0 mg/dL    Nitrite, UA Negative Negative    Microscopic Examination Comment     MICROSCOPIC EXAMINATION See below:     Urinalysis Reflex Comment    TSH   Result Value Ref Range    TSH 2.100 0.270 - 4.200 mIU/mL   T4, free   Result Value Ref Range    Free T4 1.53 0.93 - 1.70 ng/dL   Vitamin D 25 hydroxy   Result Value Ref Range    25 Hydroxy, Vitamin D 33.9 ng/ml   Microscopic Examination   Result Value Ref Range    WBC, UA 0-5 0 - 5 /hpf    RBC, UA 0-2 0 - 2 /hpf    Epithelial Cells (non renal) 0-10 0 - 10 /hpf    Casts Present (A) None seen /lpf    Cast Type Granular casts (A) N/A    Mucus, UA Present Not Estab. /hpf    Bacteria, UA Few None seen/Few /hpf   CBC & Differential   Result Value Ref Range    WBC 9.41 4.80 - 10.80 10*3/mm3    RBC 4.44 4.20 - 5.40 10*6/mm3    Hemoglobin 12.5 12.0 - 16.0 g/dL    Hematocrit 39.2 37.0 - 47.0 %    MCV 88.3 81.0 - 99.0 fL    MCH 28.2 27.0 - 31.0 pg    MCHC 31.9 31.0 - 37.0 g/dL    RDW 13.8 11.5 - 14.5 %    Platelets 279 140 - 500 10*3/mm3    Neutrophil Rel % 67.7 45.0 - 70.0 %    Lymphocyte Rel % 24.2 20.0 - 45.0 %    Monocyte Rel % 6.1 3.0 - 8.0 %    Eosinophil Rel % 1.4 0.0 - 4.0 %    Basophil Rel % 0.3 0.0 - 2.0 %    Neutrophils Absolute 6.37 1.50 - 8.30 10*3/mm3    Lymphocytes Absolute 2.28 0.60 - 4.80 10*3/mm3    Monocytes Absolute 0.57 0.00 - 1.00 10*3/mm3    Eosinophils Absolute 0.13  0.10 - 0.30 10*3/mm3    Basophils Absolute 0.03 0.00 - 0.20 10*3/mm3    Immature Granulocyte Rel % 0.3 0.0 - 0.5 %    Immature Grans Absolute 0.03 0.00 - 0.03 10*3/mm3    nRBC 0.0 0.0 - 0.0 /100 WBC           Oriana was seen today for follow-up and irritable.    Diagnoses and all orders for this visit:    Obesity (BMI 30-39.9)    Overeating    Other orders  -     buPROPion XL (WELLBUTRIN XL) 300 MG 24 hr tablet; Take 1 tablet by mouth Daily.      Increase Wellbutrin to 300mg and will see her back in 6 months  She needs to work on snacking and and increase exercise   She needs to work on meal prepping for lunch   Spent 50% of 25 minutes in counseling regarding her weight and things that she can do to help with weight loss     Return in about 6 months (around 5/8/2019) for Recheck.    Tara Jimenes MD  11/08/2018

## 2018-11-16 ENCOUNTER — TELEPHONE (OUTPATIENT)
Dept: INTERNAL MEDICINE | Facility: CLINIC | Age: 28
End: 2018-11-16

## 2018-11-16 NOTE — TELEPHONE ENCOUNTER
LVM with below in detail on patient personal mailbox. Advised patient to return call to schedule f/u with another provider in office.     ----- Message from Baudilio Salazar MD sent at 11/16/2018 10:16 AM EST -----  Takes 2 weeks to notice differecne needs appt in 2-3 weeks  ----- Message -----  From: Farzaneh Alberto CMA  Sent: 11/14/2018   3:49 PM  To: Baudilio Salazar MD    To my knowledge dose change can take a few weeks to notice a difference correct? If I am incorrect, please advise and I will contact patient. Thank you    ===View-only below this line==                    ----- Message -----  From: Oriana Mina  Sent: 11/14/2018  11:05 AM  To: Tara Jimenes MD  Subject: Prescription Question                            ----- Message from Mychart, Generic sent at 11/14/2018 11:05 AM EST -----    I started taking the higher dose of my wellbrutrin on 11/9, should I notice it working at this point? I haven't noticed any sort of difference with the higher dose.

## 2019-01-03 ENCOUNTER — TELEPHONE (OUTPATIENT)
Dept: INTERNAL MEDICINE | Facility: CLINIC | Age: 29
End: 2019-01-03

## 2019-01-03 RX ORDER — AMOXICILLIN 875 MG/1
875 TABLET, COATED ORAL EVERY 12 HOURS
Qty: 20 TABLET | Refills: 0 | Status: SHIPPED | OUTPATIENT
Start: 2019-01-03 | End: 2019-02-22

## 2019-01-03 NOTE — TELEPHONE ENCOUNTER
RX sent to local pharmacy.    ---------------  Giovana Douglas APRN   You 1 hour ago (1:38 PM)        Amoxil 875mg  Si po [id number] for 10 days  Disp #20        ----- Message -----  From: Farzaneh Alberto CMA  Sent: 1/3/2019   1:09 PM  To: NELDA Root  Subject: FW: Non-Urgent Medical Question                ----- Message -----  From: Oriana Mina  Sent: 1/3/2019  10:33 AM  To: Marilyn Maurer [facility]  Subject: Non-Urgent Medical Question                      ----- Message from Mychart, Generic sent at 1/3/2019 10:33 AM EST -----    My entire family was diagnosed with influenza B over the weekend. I am now having major ear pain on my left side. Is there anyway they can call me in a script for an ear infection? With 3 sick kids running fevers and vomiting I have no way to get in there, but the immediate care center said this was a large possibility after getting the flu.     Thanks

## 2019-02-21 RX ORDER — LEVOTHYROXINE SODIUM 0.1 MG/1
TABLET ORAL
Qty: 90 TABLET | Refills: 1 | Status: SHIPPED | OUTPATIENT
Start: 2019-02-21 | End: 2019-10-13 | Stop reason: SDUPTHER

## 2019-02-22 ENCOUNTER — OFFICE VISIT (OUTPATIENT)
Dept: INTERNAL MEDICINE | Facility: CLINIC | Age: 29
End: 2019-02-22

## 2019-02-22 VITALS
TEMPERATURE: 98.3 F | RESPIRATION RATE: 16 BRPM | DIASTOLIC BLOOD PRESSURE: 82 MMHG | SYSTOLIC BLOOD PRESSURE: 124 MMHG | OXYGEN SATURATION: 98 % | BODY MASS INDEX: 32.47 KG/M2 | WEIGHT: 202 LBS | HEIGHT: 66 IN | HEART RATE: 79 BPM

## 2019-02-22 DIAGNOSIS — H69.83 ETD (EUSTACHIAN TUBE DYSFUNCTION), BILATERAL: ICD-10-CM

## 2019-02-22 DIAGNOSIS — J30.2 SEASONAL ALLERGIC RHINITIS, UNSPECIFIED TRIGGER: Primary | ICD-10-CM

## 2019-02-22 DIAGNOSIS — R09.82 PND (POST-NASAL DRIP): ICD-10-CM

## 2019-02-22 PROCEDURE — 99213 OFFICE O/P EST LOW 20 MIN: CPT | Performed by: INTERNAL MEDICINE

## 2019-02-22 RX ORDER — FLUNISOLIDE 0.25 MG/ML
2 SOLUTION NASAL 2 TIMES DAILY
Qty: 1 BOTTLE | Refills: 3 | Status: SHIPPED | OUTPATIENT
Start: 2019-02-22 | End: 2019-05-13

## 2019-02-22 RX ORDER — MONTELUKAST SODIUM 10 MG/1
10 TABLET ORAL NIGHTLY
Qty: 30 TABLET | Refills: 6 | Status: SHIPPED | OUTPATIENT
Start: 2019-02-22 | End: 2020-04-10 | Stop reason: SDUPTHER

## 2019-02-22 RX ORDER — LEVOCETIRIZINE DIHYDROCHLORIDE 5 MG/1
5 TABLET, FILM COATED ORAL EVERY EVENING
COMMUNITY
End: 2019-05-13

## 2019-02-22 NOTE — PROGRESS NOTES
"      Oriana Mina is a 28 y.o. female, who presents with a chief complaint of   Chief Complaint   Patient presents with   • Nasal Congestion     2 x day    • Ear Fullness     2 x day   • Cough       27 yo F here with a few days of congestion, drainage and ear fullness. She has had headaches and choking on mucous. No fever. No SOB or wheezing. She feels that she has \"something\" down in her chest. Sick contacts at work. She is taking Zyrtec and Claritin and Mucinex that has not helped.          The following portions of the patient's history were reviewed and updated as appropriate: allergies, current medications, past family history, past medical history, past social history, past surgical history and problem list.    Allergies: Sulfa antibiotics    Current Outpatient Medications:   •  BIOTIN PO, Take  by mouth Daily., Disp: , Rfl:   •  buPROPion XL (WELLBUTRIN XL) 300 MG 24 hr tablet, Take 1 tablet by mouth Daily., Disp: 30 tablet, Rfl: 6  •  Cholecalciferol (VITAMIN D3 PO), Take  by mouth Daily., Disp: , Rfl:   •  Cyanocobalamin (VITAMIN B-12 PO), Take  by mouth Daily., Disp: , Rfl:   •  Dextromethorphan-Guaifenesin (MUCINEX DM PO), Take  by mouth., Disp: , Rfl:   •  Etonogestrel (NEXPLANON) 68 MG implant subdermal implant, Inject 1 each into the skin 1 (One) Time., Disp: , Rfl:   •  levocetirizine (XYZAL) 5 MG tablet, Take 5 mg by mouth Every Evening., Disp: , Rfl:   •  levothyroxine (SYNTHROID, LEVOTHROID) 100 MCG tablet, TAKE 1 TABLET BY MOUTH EVERY DAY, Disp: 90 tablet, Rfl: 1  •  Probiotic Product (PRO-BIOTIC BLEND PO), Take  by mouth Daily., Disp: , Rfl:   •  valACYclovir (VALTREX) 1000 MG tablet, TAKE 1 TABLET EVERY DAY FOR 3 DAYS WHEN YOU HAVE AN OUTBREAK, Disp: 30 tablet, Rfl: 0  •  flunisolide (NASALIDE) 25 MCG/ACT (0.025%) solution nasal spray, Inhale 2 sprays 2 (Two) Times a Day., Disp: 1 bottle, Rfl: 3  •  montelukast (SINGULAIR) 10 MG tablet, Take 1 tablet by mouth Every Night., Disp: 30 " "tablet, Rfl: 6  Medications Discontinued During This Encounter   Medication Reason   • amoxicillin (AMOXIL) 875 MG tablet *Therapy completed   • Loratadine (CLARITIN PO) *Therapy completed       Review of Systems   Constitutional: Positive for activity change and fatigue. Negative for chills and fever.   HENT: Positive for congestion, ear pain, postnasal drip, rhinorrhea, sinus pressure and sinus pain.    Respiratory: Positive for cough. Negative for shortness of breath and wheezing.    Neurological: Positive for headaches.             /82 (BP Location: Left arm, Patient Position: Sitting, Cuff Size: Large Adult)   Pulse 79   Temp 98.3 °F (36.8 °C) (Oral)   Resp 16   Ht 167.6 cm (65.98\")   Wt 91.6 kg (202 lb)   SpO2 98%   BMI 32.62 kg/m²       Physical Exam   Constitutional: She is oriented to person, place, and time. She appears well-developed and well-nourished. No distress.   HENT:   Head: Normocephalic and atraumatic.   Right Ear: External ear normal.   Left Ear: External ear normal.   Mouth/Throat: Oropharynx is clear and moist. No oropharyngeal exudate.   Eyes: Conjunctivae are normal. Right eye exhibits no discharge. Left eye exhibits no discharge. No scleral icterus.   Neck: Neck supple.   Cardiovascular: Normal rate, regular rhythm and normal heart sounds. Exam reveals no gallop and no friction rub.   No murmur heard.  Pulmonary/Chest: Effort normal and breath sounds normal. No respiratory distress. She has no wheezes. She has no rales.   Abdominal: Soft. Bowel sounds are normal. She exhibits no distension and no mass. There is no tenderness. There is no guarding.   Lymphadenopathy:     She has no cervical adenopathy.   Neurological: She is alert and oriented to person, place, and time.   Skin: Skin is warm. No rash noted.   Psychiatric: She has a normal mood and affect. Her behavior is normal.   Nursing note and vitals reviewed.        Results for orders placed or performed in visit on " 09/22/18   Comprehensive metabolic panel   Result Value Ref Range    Glucose 80 65 - 99 mg/dL    BUN 7 6 - 20 mg/dL    Creatinine 0.53 (L) 0.57 - 1.00 mg/dL    eGFR Non African Am 137 >60 mL/min/1.73    eGFR African Am >150 >60 mL/min/1.73    BUN/Creatinine Ratio 13.2 7.0 - 25.0    Sodium 140 136 - 145 mmol/L    Potassium 4.0 3.5 - 5.2 mmol/L    Chloride 105 98 - 107 mmol/L    Total CO2 25.6 22.0 - 29.0 mmol/L    Calcium 8.7 8.6 - 10.5 mg/dL    Total Protein 7.1 6.0 - 8.5 g/dL    Albumin 4.50 3.50 - 5.20 g/dL    Globulin 2.6 gm/dL    A/G Ratio 1.7 g/dL    Total Bilirubin 0.3 0.2 - 1.2 mg/dL    Alkaline Phosphatase 81 40 - 129 U/L    AST (SGOT) 14 5 - 32 U/L    ALT (SGPT) 15 5 - 33 U/L   Lipid Panel With LDL/HDL Ratio   Result Value Ref Range    Total Cholesterol 175 0 - 200 mg/dL    Triglycerides 156 (H) 0 - 150 mg/dL    HDL Cholesterol 37 (L) 40 - 60 mg/dL    VLDL Cholesterol 31.2 (H) 7 - 27 mg/dL    LDL Cholesterol  107 (H) 0 - 100 mg/dL    LDL/HDL Ratio 2.89    Urinalysis With Culture If Indicated - Urine, Clean Catch   Result Value Ref Range    Specific Gravity, UA 1.021 1.005 - 1.030    pH, UA 6.0 5.0 - 7.5    Color, UA Yellow Yellow    Appearance, UA Clear Clear    Leukocytes, UA Negative Negative    Protein Negative Negative/Trace    Glucose, UA Negative Negative    Ketones Negative Negative    Blood, UA Negative Negative    Bilirubin, UA Negative Negative    Urobilinogen, UA 0.2 0.2 - 1.0 mg/dL    Nitrite, UA Negative Negative    Microscopic Examination Comment     Microscopic Examination See below:     Urinalysis Reflex Comment    TSH   Result Value Ref Range    TSH 2.100 0.270 - 4.200 mIU/mL   T4, free   Result Value Ref Range    Free T4 1.53 0.93 - 1.70 ng/dL   Vitamin D 25 hydroxy   Result Value Ref Range    25 Hydroxy, Vitamin D 33.9 ng/ml   Microscopic Examination   Result Value Ref Range    WBC, UA 0-5 0 - 5 /hpf    RBC, UA 0-2 0 - 2 /hpf    Epithelial Cells (non renal) 0-10 0 - 10 /hpf    Casts  Present (A) None seen /lpf    Cast Type Granular casts (A) N/A    Mucus, UA Present Not Estab. /hpf    Bacteria, UA Few None seen/Few /hpf   CBC & Differential   Result Value Ref Range    WBC 9.41 4.80 - 10.80 10*3/mm3    RBC 4.44 4.20 - 5.40 10*6/mm3    Hemoglobin 12.5 12.0 - 16.0 g/dL    Hematocrit 39.2 37.0 - 47.0 %    MCV 88.3 81.0 - 99.0 fL    MCH 28.2 27.0 - 31.0 pg    MCHC 31.9 31.0 - 37.0 g/dL    RDW 13.8 11.5 - 14.5 %    Platelets 279 140 - 500 10*3/mm3    Neutrophil Rel % 67.7 45.0 - 70.0 %    Lymphocyte Rel % 24.2 20.0 - 45.0 %    Monocyte Rel % 6.1 3.0 - 8.0 %    Eosinophil Rel % 1.4 0.0 - 4.0 %    Basophil Rel % 0.3 0.0 - 2.0 %    Neutrophils Absolute 6.37 1.50 - 8.30 10*3/mm3    Lymphocytes Absolute 2.28 0.60 - 4.80 10*3/mm3    Monocytes Absolute 0.57 0.00 - 1.00 10*3/mm3    Eosinophils Absolute 0.13 0.10 - 0.30 10*3/mm3    Basophils Absolute 0.03 0.00 - 0.20 10*3/mm3    Immature Granulocyte Rel % 0.3 0.0 - 0.5 %    Immature Grans Absolute 0.03 0.00 - 0.03 10*3/mm3    nRBC 0.0 0.0 - 0.0 /100 WBC           Oriana was seen today for nasal congestion, ear fullness and cough.    Diagnoses and all orders for this visit:    Seasonal allergic rhinitis, unspecified trigger    PND (post-nasal drip)    ETD (Eustachian tube dysfunction), bilateral    Other orders  -     montelukast (SINGULAIR) 10 MG tablet; Take 1 tablet by mouth Every Night.  -     flunisolide (NASALIDE) 25 MCG/ACT (0.025%) solution nasal spray; Inhale 2 sprays 2 (Two) Times a Day.    I think that this is a bad case of allergies   Start Xyzal, add inhaled nasal steroid and start Singulair   May consider going back on allergy shots if not better   Ear pain will take time resolve due to fluid   She has a new cat that is likely contributing as well       Return if symptoms worsen or fail to improve.    Tara Jimenes MD  02/22/2019

## 2019-03-28 RX ORDER — SUMATRIPTAN 25 MG/1
TABLET, FILM COATED ORAL
Qty: 12 TABLET | Refills: 0 | Status: SHIPPED | OUTPATIENT
Start: 2019-03-28 | End: 2019-05-13

## 2019-05-02 DIAGNOSIS — E03.8 OTHER SPECIFIED HYPOTHYROIDISM: ICD-10-CM

## 2019-05-02 DIAGNOSIS — E78.2 MIXED HYPERLIPIDEMIA: Primary | ICD-10-CM

## 2019-05-13 ENCOUNTER — OFFICE VISIT (OUTPATIENT)
Dept: INTERNAL MEDICINE | Facility: CLINIC | Age: 29
End: 2019-05-13

## 2019-05-13 VITALS
HEART RATE: 66 BPM | RESPIRATION RATE: 18 BRPM | BODY MASS INDEX: 29.09 KG/M2 | SYSTOLIC BLOOD PRESSURE: 120 MMHG | OXYGEN SATURATION: 99 % | WEIGHT: 181 LBS | HEIGHT: 66 IN | DIASTOLIC BLOOD PRESSURE: 80 MMHG | TEMPERATURE: 98.7 F

## 2019-05-13 DIAGNOSIS — E66.3 OVERWEIGHT (BMI 25.0-29.9): ICD-10-CM

## 2019-05-13 DIAGNOSIS — E78.2 MIXED HYPERLIPIDEMIA: Primary | ICD-10-CM

## 2019-05-13 DIAGNOSIS — E03.8 OTHER SPECIFIED HYPOTHYROIDISM: ICD-10-CM

## 2019-05-13 DIAGNOSIS — IMO0002 CHRONIC MIGRAINE: ICD-10-CM

## 2019-05-13 PROCEDURE — 99214 OFFICE O/P EST MOD 30 MIN: CPT | Performed by: INTERNAL MEDICINE

## 2019-05-13 NOTE — PROGRESS NOTES
Oriana Mina is a 29 y.o. female, who presents with a chief complaint of   Chief Complaint   Patient presents with   • Hyperlipidemia   • Follow-up       28 yo F here for follow up of her HLD and worsening migraines.     She has seen headache specialist in the past. She feels that she gets pain of her forehead that radiates down into her neck. Massage has helped. She does have nausea and vomiting. Sensitive to light and sounds. She had normal MRI about 5 years ago per her. Botox injections helped and she has been on many different prophylactic medications that did not help. Imitrex does not help because she couldn't get to it fast enough. Worse since 1/2019. For the last 2 weeks, she has not taken anything    Her HLD is chronic, but is stable. She is managing this with diet and exercise.     She has lost about 40lbs since I saw her last. She is counting calories and doing beach body videos. She is exercising regularly about 4 days per week. The wellbutrin has helped with cravings.     She has chronic hypothyroidism and is doing well on synthroid. She does not have cold or heat tolerance.          The following portions of the patient's history were reviewed and updated as appropriate: allergies, current medications, past family history, past medical history, past social history, past surgical history and problem list.    Allergies: Sulfa antibiotics    Current Outpatient Medications:   •  BIOTIN PO, Take  by mouth Daily., Disp: , Rfl:   •  buPROPion XL (WELLBUTRIN XL) 300 MG 24 hr tablet, Take 1 tablet by mouth Daily., Disp: 30 tablet, Rfl: 6  •  Cholecalciferol (VITAMIN D3 PO), Take  by mouth Daily., Disp: , Rfl:   •  Cyanocobalamin (VITAMIN B-12 PO), Take  by mouth Daily., Disp: , Rfl:   •  Etonogestrel (NEXPLANON) 68 MG implant subdermal implant, Inject 1 each into the skin 1 (One) Time., Disp: , Rfl:   •  levothyroxine (SYNTHROID, LEVOTHROID) 100 MCG tablet, TAKE 1 TABLET BY MOUTH EVERY DAY, Disp:  "90 tablet, Rfl: 1  •  montelukast (SINGULAIR) 10 MG tablet, Take 1 tablet by mouth Every Night., Disp: 30 tablet, Rfl: 6  •  valACYclovir (VALTREX) 1000 MG tablet, TAKE 1 TABLET EVERY DAY FOR 3 DAYS WHEN YOU HAVE AN OUTBREAK, Disp: 30 tablet, Rfl: 0  Medications Discontinued During This Encounter   Medication Reason   • Dextromethorphan-Guaifenesin (MUCINEX DM PO) *Therapy completed   • flunisolide (NASALIDE) 25 MCG/ACT (0.025%) solution nasal spray *Therapy completed   • levocetirizine (XYZAL) 5 MG tablet *Therapy completed   • SUMAtriptan (IMITREX) 25 MG tablet *Therapy completed   • Probiotic Product (PRO-BIOTIC BLEND PO) *Therapy completed       Review of Systems   Constitutional: Negative for chills, fatigue and fever.   HENT: Negative for congestion.    Respiratory: Negative for cough and shortness of breath.    Gastrointestinal: Negative for abdominal pain, constipation, diarrhea and nausea.   Endocrine: Negative for cold intolerance and heat intolerance.   Genitourinary: Negative for difficulty urinating and dysuria.   Neurological: Positive for headaches.             /80 (BP Location: Left arm, Patient Position: Sitting, Cuff Size: Adult)   Pulse 66   Temp 98.7 °F (37.1 °C) (Oral)   Resp 18   Ht 167.6 cm (65.98\")   Wt 82.1 kg (181 lb)   SpO2 99%   BMI 29.23 kg/m²       Physical Exam   Constitutional: She is oriented to person, place, and time. She appears well-developed and well-nourished. No distress.   HENT:   Head: Normocephalic and atraumatic.   Right Ear: External ear normal.   Left Ear: External ear normal.   Mouth/Throat: Oropharynx is clear and moist. No oropharyngeal exudate.   Eyes: Conjunctivae are normal. Right eye exhibits no discharge. Left eye exhibits no discharge. No scleral icterus.   Neck: Neck supple.   Cardiovascular: Normal rate, regular rhythm and normal heart sounds. Exam reveals no gallop and no friction rub.   No murmur heard.  Pulmonary/Chest: Effort normal and breath " sounds normal. No respiratory distress. She has no wheezes. She has no rales.   Lymphadenopathy:     She has no cervical adenopathy.   Neurological: She is alert and oriented to person, place, and time.   Skin: Skin is warm. No rash noted.   Psychiatric: She has a normal mood and affect. Her behavior is normal.   Nursing note and vitals reviewed.        Results for orders placed or performed in visit on 05/06/19   Urinalysis With Culture If Indicated - Urine, Clean Catch   Result Value Ref Range    Specific Gravity, UA 1.022 1.005 - 1.030    pH, UA 5.0 5.0 - 7.5    Color, UA Yellow Yellow    Appearance, UA Clear Clear    Leukocytes, UA Negative Negative    Protein Negative Negative/Trace    Glucose, UA Negative Negative    Ketones Trace (A) Negative    Blood, UA Negative Negative    Bilirubin, UA Negative Negative    Urobilinogen, UA 0.2 0.2 - 1.0 mg/dL    Nitrite, UA Negative Negative    Microscopic Examination Comment     Microscopic Examination See below:     Urinalysis Reflex Comment    T4, free   Result Value Ref Range    Free T4 1.49 0.93 - 1.70 ng/dL   TSH   Result Value Ref Range    TSH 1.410 0.270 - 4.200 mIU/mL   Lipid Panel With LDL / HDL Ratio   Result Value Ref Range    Total Cholesterol 161 0 - 200 mg/dL    Triglycerides 99 0 - 150 mg/dL    HDL Cholesterol 37 (L) 40 - 60 mg/dL    VLDL Cholesterol 19.8 mg/dL    LDL Cholesterol  104 (H) 0 - 100 mg/dL    LDL/HDL Ratio 2.82    Comprehensive metabolic panel   Result Value Ref Range    Glucose 84 65 - 99 mg/dL    BUN 9 6 - 20 mg/dL    Creatinine 0.55 (L) 0.57 - 1.00 mg/dL    eGFR Non African Am 131 >60 mL/min/1.73    eGFR African Am >150 >60 mL/min/1.73    BUN/Creatinine Ratio 16.4 7.0 - 25.0    Sodium 141 136 - 145 mmol/L    Potassium 4.1 3.5 - 5.2 mmol/L    Chloride 102 98 - 107 mmol/L    Total CO2 24.9 22.0 - 29.0 mmol/L    Calcium 9.6 8.6 - 10.5 mg/dL    Total Protein 7.4 6.0 - 8.5 g/dL    Albumin 4.70 3.50 - 5.20 g/dL    Globulin 2.7 gm/dL    A/G Ratio  1.7 g/dL    Total Bilirubin 0.4 0.2 - 1.2 mg/dL    Alkaline Phosphatase 74 39 - 117 U/L    AST (SGOT) 13 1 - 32 U/L    ALT (SGPT) 14 1 - 33 U/L   Microscopic Examination -   Result Value Ref Range    WBC, UA 0-5 0 - 5 /hpf    RBC, UA 0-2 0 - 2 /hpf    Epithelial Cells (non renal) 0-10 0 - 10 /hpf    Mucus, UA Present Not Estab. /hpf    Bacteria, UA None seen None seen/Few /hpf   CBC & Differential   Result Value Ref Range    WBC 8.91 3.40 - 10.80 10*3/mm3    RBC 4.40 3.77 - 5.28 10*6/mm3    Hemoglobin 12.6 12.0 - 15.9 g/dL    Hematocrit 40.4 34.0 - 46.6 %    MCV 91.8 79.0 - 97.0 fL    MCH 28.6 26.6 - 33.0 pg    MCHC 31.2 (L) 31.5 - 35.7 g/dL    RDW 14.1 12.3 - 15.4 %    Platelets 257 140 - 450 10*3/mm3    Neutrophil Rel % 69.1 42.7 - 76.0 %    Lymphocyte Rel % 23.9 19.6 - 45.3 %    Monocyte Rel % 5.5 5.0 - 12.0 %    Eosinophil Rel % 0.8 0.3 - 6.2 %    Basophil Rel % 0.3 0.0 - 1.5 %    Neutrophils Absolute 6.15 1.70 - 7.00 10*3/mm3    Lymphocytes Absolute 2.13 0.70 - 3.10 10*3/mm3    Monocytes Absolute 0.49 0.10 - 0.90 10*3/mm3    Eosinophils Absolute 0.07 0.00 - 0.40 10*3/mm3    Basophils Absolute 0.03 0.00 - 0.20 10*3/mm3    Immature Granulocyte Rel % 0.4 0.0 - 0.5 %    Immature Grans Absolute 0.04 0.00 - 0.05 10*3/mm3    nRBC 0.0 0.0 - 0.2 /100 WBC           Oriana was seen today for hyperlipidemia and follow-up.    Diagnoses and all orders for this visit:    Mixed hyperlipidemia    Overweight (BMI 25.0-29.9)    Chronic migraine    Other specified hypothyroidism      Patient's cholesterol is under good control. Will continue current regimen of diet and exercise.  Will follow up in 12 months to monitor levels.     Will continue her Wellbutrin for now. It has helped with her cravings and she has lost about 40lbs. Will see back in 1 year.     TSH has been stable and patient is doing well. Will continue current regimen of levothyroxine 100 mcg and follow up levels in 12 months.       She does have chronic  migraines. She has tried everything and I have discussed Aimovig today. She is going to look into this and call me back. If she decides to start, will need to see me back in 4-6 weeks.     Return in about 1 year (around 5/13/2020) for Recheck, Annual physical.    Tara Jimenes MD  05/13/2019

## 2019-06-20 ENCOUNTER — OFFICE VISIT (OUTPATIENT)
Dept: INTERNAL MEDICINE | Facility: CLINIC | Age: 29
End: 2019-06-20

## 2019-06-20 VITALS
SYSTOLIC BLOOD PRESSURE: 122 MMHG | HEART RATE: 67 BPM | WEIGHT: 183 LBS | RESPIRATION RATE: 14 BRPM | OXYGEN SATURATION: 100 % | HEIGHT: 66 IN | DIASTOLIC BLOOD PRESSURE: 82 MMHG | BODY MASS INDEX: 29.41 KG/M2

## 2019-06-20 DIAGNOSIS — R63.1 POLYDIPSIA: Primary | ICD-10-CM

## 2019-06-20 DIAGNOSIS — F32.A DEPRESSION, UNSPECIFIED DEPRESSION TYPE: ICD-10-CM

## 2019-06-20 DIAGNOSIS — R63.2 POLYPHAGIA: ICD-10-CM

## 2019-06-20 DIAGNOSIS — IMO0002 CHRONIC MIGRAINE: ICD-10-CM

## 2019-06-20 DIAGNOSIS — R53.83 FATIGUE, UNSPECIFIED TYPE: ICD-10-CM

## 2019-06-20 LAB
A/C: <30
HBA1C MFR BLD: 5.3 %
POC CREATININE URINE: 200
POC MICROALBUMIN URINE: 30

## 2019-06-20 PROCEDURE — 83036 HEMOGLOBIN GLYCOSYLATED A1C: CPT | Performed by: INTERNAL MEDICINE

## 2019-06-20 PROCEDURE — 99214 OFFICE O/P EST MOD 30 MIN: CPT | Performed by: INTERNAL MEDICINE

## 2019-06-20 PROCEDURE — 82044 UR ALBUMIN SEMIQUANTITATIVE: CPT | Performed by: INTERNAL MEDICINE

## 2019-06-20 RX ORDER — IBUPROFEN 800 MG/1
TABLET ORAL
Refills: 0 | COMMUNITY
Start: 2019-06-05

## 2019-06-20 RX ORDER — BUPROPION HYDROCHLORIDE 300 MG/1
300 TABLET ORAL DAILY
Qty: 30 TABLET | Refills: 6 | Status: SHIPPED | OUTPATIENT
Start: 2019-06-20 | End: 2019-12-12 | Stop reason: SDUPTHER

## 2019-06-20 RX ORDER — ZOLMITRIPTAN 5 MG/1
1 SPRAY NASAL AS NEEDED
Qty: 6 EACH | Refills: 0 | Status: SHIPPED | OUTPATIENT
Start: 2019-06-20 | End: 2019-06-25

## 2019-06-20 RX ORDER — CHLORHEXIDINE GLUCONATE 0.12 MG/ML
RINSE ORAL
Refills: 0 | COMMUNITY
Start: 2019-06-13 | End: 2019-11-21

## 2019-06-20 RX ORDER — PENICILLIN V POTASSIUM 500 MG/1
TABLET ORAL
Refills: 0 | COMMUNITY
Start: 2019-06-11 | End: 2019-11-21

## 2019-06-20 NOTE — PROGRESS NOTES
Oriana Mina is a 29 y.o. female, who presents with a chief complaint of   Chief Complaint   Patient presents with   • Follow-up     6 x week f/u   • Migraine     aimovig med check, x concerns, x issues, every day for 2 x weeks   • Fatigue     x concerns, sleeping a lot   • Polydipsia       28 yo F here for follow up. 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 She feels that there is a lot of things going on now in her life. Stressed about her children, fighting with medicaid over insurance issues, and her  that has Bipolar disorder.  She has been stress eating since stopping her Wellbutrin for unknown period of time.     Her headaches were slightly better on Aimovig and took 70mg, but then returned [facility] through the month. She is getting a bite guard at night. She is not taking anything for her headaches including Tylenol and Ibuprofen. She is taking magnesium daily. She is drinking one serving of caffeine per day.  Not interested in seeing headache doctor.     She has been extremely thirsty for the last week. Feels very fatigued and not sleeping well.          The following portions of the patient's history were reviewed and updated as appropriate: allergies, current medications, past family history, past medical history, past social history, past surgical history and problem list.    Allergies: Sulfa antibiotics    Current Outpatient Medications:   •  BIOTIN PO, Take  by mouth Daily., Disp: , Rfl:   •  buPROPion XL (WELLBUTRIN XL) 300 MG 24 hr tablet, Take 1 tablet by mouth Daily., Disp: 30 tablet, Rfl: 6  •  chlorhexidine (PERIDEX) 0.12 % solution, RINSE AND SPIT 1/2 OUNCE BID. RINSE FOR 30 SECONDS, Disp: , Rfl: 0  •  Cholecalciferol (VITAMIN D3 PO), Take  by mouth Daily., Disp: , Rfl:   •  Cyanocobalamin (VITAMIN B-12 PO), Take  by mouth Daily., Disp: , Rfl:   •  Erenumab-aooe (AIMOVIG) 70 MG/ML prefilled syringe, Inject 2 mL under the skin into the appropriate area as directed  "Every 30 (Thirty) Days., Disp: 2 mL, Rfl: 3  •  Etonogestrel (NEXPLANON) 68 MG implant subdermal implant, Inject 1 each into the skin 1 (One) Time., Disp: , Rfl:   •  ibuprofen (ADVIL,MOTRIN) 800 MG tablet, , Disp: , Rfl: 0  •  levothyroxine (SYNTHROID, LEVOTHROID) 100 MCG tablet, TAKE 1 TABLET BY MOUTH EVERY DAY, Disp: 90 tablet, Rfl: 1  •  lidocaine viscous (XYLOCAINE) 2 % solution, APPLY AS DIRECTED WITH COTTON SWAB, Disp: , Rfl: 1  •  montelukast (SINGULAIR) 10 MG tablet, Take 1 tablet by mouth Every Night., Disp: 30 tablet, Rfl: 6  •  penicillin v potassium (VEETID) 500 MG tablet, , Disp: , Rfl: 0  •  valACYclovir (VALTREX) 1000 MG tablet, TAKE 1 TABLET EVERY DAY FOR 3 DAYS WHEN YOU HAVE AN OUTBREAK, Disp: 30 tablet, Rfl: 0  •  ZOLMitriptan (ZOMIG) 5 MG nasal solution, 1 spray into the nostril(s) as directed by provider As Needed for Migraine., Disp: 6 each, Rfl: 0  Medications Discontinued During This Encounter   Medication Reason   • buPROPion XL (WELLBUTRIN XL) 300 MG 24 hr tablet *Therapy completed   • Erenumab-aooe (AIMOVIG) 70 MG/ML prefilled syringe *Therapy completed       Review of Systems   Constitutional: Positive for fatigue. Negative for chills and fever.   Respiratory: Negative for cough and shortness of breath.    Endocrine: Positive for polydipsia and polyphagia.   Genitourinary: Negative for difficulty urinating and dysuria.   Skin: Negative for rash.   Neurological: Positive for headaches.   Psychiatric/Behavioral: Positive for decreased concentration, dysphoric mood and sleep disturbance.             /82 (BP Location: Left arm, Patient Position: Sitting, Cuff Size: Adult)   Pulse 67   Resp 14   Ht 167.6 cm (65.98\")   Wt 83 kg (183 lb)   SpO2 100%   BMI 29.55 kg/m²       Physical Exam   Constitutional: She is oriented to person, place, and time. She appears well-developed and well-nourished. No distress.   HENT:   Head: Normocephalic and atraumatic.   Right Ear: External ear " normal.   Left Ear: External ear normal.   Mouth/Throat: Oropharynx is clear and moist. No oropharyngeal exudate.   Eyes: Conjunctivae are normal. Right eye exhibits no discharge. Left eye exhibits no discharge. No scleral icterus.   Neck: Neck supple.   Pulmonary/Chest: Effort normal.   Lymphadenopathy:     She has no cervical adenopathy.   Neurological: She is alert and oriented to person, place, and time.   Skin: Skin is warm. No rash noted.   Psychiatric: She has a normal mood and affect. Her behavior is normal.   Nursing note and vitals reviewed.        Results for orders placed or performed in visit on 06/20/19   POC Glycosylated Hemoglobin (Hb A1C)   Result Value Ref Range    Hemoglobin A1C 5.3 %   POCT microalbumin   Result Value Ref Range    Microalbumin, Urine 30 mg/L    Creatinine, Urine 200 mg/dL    A/C <30 mg/g           Oriana was seen today for follow-up, migraine, fatigue and polydipsia.    Diagnoses and all orders for this visit:    Polydipsia  -     POC Glycosylated Hemoglobin (Hb A1C)  -     Cancel: POCT microalbumin  -     POCT microalbumin    Chronic migraine    Fatigue, unspecified type    Polyphagia    Depression, unspecified depression type    Other orders  -     Erenumab-aooe (AIMOVIG) 70 MG/ML prefilled syringe; Inject 2 mL under the skin into the appropriate area as directed Every 30 (Thirty) Days.  -     buPROPion XL (WELLBUTRIN XL) 300 MG 24 hr tablet; Take 1 tablet by mouth Daily.  -     ZOLMitriptan (ZOMIG) 5 MG nasal solution; 1 spray into the nostril(s) as directed by provider As Needed for Migraine.        Her HgA1c and urine all look normal. No concern for diabetes. I think that her thirst is possibly related to her diet.     The Aimovig did help, but just didn't last. Will increase dose to 140mg and see if this helps. Will use Zomig intranasal that may help. Will try acupuncture as well. Gave her information on local providers.     I do think that she needs to go back on her  Wellbutrin. I think that she is depressed and over eating. Hoping that this will help her.     Will call me and keep me updated about her mood and headaches.   Return for Next scheduled follow up.    Tara Jimenes MD  06/20/2019

## 2019-06-25 RX ORDER — ELETRIPTAN HYDROBROMIDE 20 MG/1
20 TABLET, FILM COATED ORAL ONCE AS NEEDED
Qty: 30 TABLET | Refills: 0 | Status: SHIPPED | OUTPATIENT
Start: 2019-06-25 | End: 2019-11-11

## 2019-07-01 ENCOUNTER — DOCUMENTATION (OUTPATIENT)
Dept: INTERNAL MEDICINE | Facility: CLINIC | Age: 29
End: 2019-07-01

## 2019-07-01 RX ORDER — TOPIRAMATE 50 MG/1
50 TABLET, FILM COATED ORAL DAILY
Qty: 30 TABLET | Refills: 1 | Status: SHIPPED | OUTPATIENT
Start: 2019-07-01 | End: 2019-07-01 | Stop reason: SDUPTHER

## 2019-07-01 RX ORDER — TOPIRAMATE 50 MG/1
50 TABLET, FILM COATED ORAL DAILY
Qty: 90 TABLET | Refills: 1 | Status: SHIPPED | OUTPATIENT
Start: 2019-07-01 | End: 2019-07-20 | Stop reason: SDUPTHER

## 2019-07-22 RX ORDER — TOPIRAMATE 50 MG/1
50 TABLET, FILM COATED ORAL DAILY
Qty: 90 TABLET | Refills: 1 | Status: SHIPPED | OUTPATIENT
Start: 2019-07-22 | End: 2019-11-21

## 2019-10-14 RX ORDER — LEVOTHYROXINE SODIUM 0.1 MG/1
TABLET ORAL
Qty: 90 TABLET | Refills: 0 | Status: SHIPPED | OUTPATIENT
Start: 2019-10-14 | End: 2019-12-11 | Stop reason: SDUPTHER

## 2019-11-11 ENCOUNTER — OFFICE VISIT (OUTPATIENT)
Dept: FAMILY MEDICINE CLINIC | Facility: CLINIC | Age: 29
End: 2019-11-11

## 2019-11-11 VITALS
SYSTOLIC BLOOD PRESSURE: 118 MMHG | DIASTOLIC BLOOD PRESSURE: 80 MMHG | TEMPERATURE: 98.1 F | RESPIRATION RATE: 14 BRPM | WEIGHT: 169 LBS | OXYGEN SATURATION: 98 % | HEIGHT: 65 IN | HEART RATE: 82 BPM | BODY MASS INDEX: 28.16 KG/M2

## 2019-11-11 DIAGNOSIS — F90.0 ATTENTION DEFICIT HYPERACTIVITY DISORDER (ADHD), PREDOMINANTLY INATTENTIVE TYPE: Primary | ICD-10-CM

## 2019-11-11 DIAGNOSIS — F41.9 ANXIETY: ICD-10-CM

## 2019-11-11 DIAGNOSIS — Z00.00 ENCOUNTER FOR WELL ADULT EXAM WITHOUT ABNORMAL FINDINGS: ICD-10-CM

## 2019-11-11 DIAGNOSIS — IMO0002 CHRONIC MIGRAINE: ICD-10-CM

## 2019-11-11 PROCEDURE — 99214 OFFICE O/P EST MOD 30 MIN: CPT | Performed by: FAMILY MEDICINE

## 2019-11-11 RX ORDER — INFLUENZA A VIRUS A/SINGAPORE/GP1908/2015 IVR-180A (H1N1) ANTIGEN (PROPIOLACTONE INACTIVATED), INFLUENZA A VIRUS A/SINGAPORE/INFIMH-16-0019/2016 IVR-186 (H3N2) ANTIGEN (PROPIOLACTONE INACTIVATED), INFLUENZA B VIRUS B/MARYLAND/15/2016 ANTIGEN (PROPIOLACTONE INACTIVATED), AND INFLUENZA B VIRUS B/PHUKET/3073/2013 BVR-1B ANTIGEN (PROPIOLACTONE INACTIVATED) 15; 15; 15; 15 UG/.5ML; UG/.5ML; UG/.5ML; UG/.5ML
INJECTION, SUSPENSION INTRAMUSCULAR
Refills: 0 | COMMUNITY
Start: 2019-09-02 | End: 2019-11-21

## 2019-11-11 NOTE — ASSESSMENT & PLAN NOTE
I recommend she eliminate breads and pastas from her diet. Also eliminate sweetened and artificially sweetened beverages.  Refer to neurology.

## 2019-11-11 NOTE — ASSESSMENT & PLAN NOTE
Pt agrees to call his insurance to get a list of covered psychiatrists and then he will call and schedule an appointment for herself.  Pt agrees to call her insurance company and get a list of psychologists/therapists and schedule an appointment and start counseling.

## 2019-11-11 NOTE — PROGRESS NOTES
Subjective   Oriana Mina is a 29 y.o. female is here for   Chief Complaint   Patient presents with   • Establish Care   • Hypothyroidism   • Headache   • Anxiety   • Allergies       History of Present Illness     Pt is a  at [employer].      Pt states she has anxiety and OCD. She states she also has ADHD.  She states she was on Wellbutrin which helped curb her appetite.  She states that she lost 60 pounds and then stopped taking it.  She states she got more anxious after she stopped the medicine and states that Dr. Jimenes thought that it may have actually been helping her anxiety and recommended she stay on that medicine.  She states she used to have a lot of anxiety attacks. She was in therapy 5 years ago, with Dr. Basia Meza.  She was on Seroquel which caused weight gain. She was on Clonazepam which she states helped but she did not like the way it made her feel.  She tried several depression medications at first which did not help. She states she had to stop seeing Dr. Meza because she did not take her insurance.    She also has chronic migraines.  She was having migraines daily.  She started taking Topamax and now has migraines 2-3 times per week.  She is seen a neurologist in the past and tried Botox, which did not help, about 5 years ago.  She has tried 32 different migraine medicines, which did not work.  Some of these medicines include Maxalt, Effexor, Seroquel, Imitrex, Relpax, moving, Prozac, Celexa, Lexapro, Cymbalta, gabapentin, Tylenol, ibuprofen, naproxen, Excedrin, aspirin.  She thinks looking at a computer monitor triggers her headaches.  She states bread causes her some GI problems, stomach ache.        The following portions of the patient's history were reviewed and updated as appropriate: allergies, current medications, past family history, past medical history, past social history, past surgical history and problem list.     reports that she has never smoked. She has  "never used smokeless tobacco. She reports that she drinks alcohol. She reports that she does not use drugs.    Review of Systems   Constitutional: Negative for activity change and unexpected weight change.   Respiratory: Negative for shortness of breath and wheezing.    Cardiovascular: Negative for chest pain and palpitations.   Gastrointestinal: Negative for abdominal pain, blood in stool and constipation.   Genitourinary: Negative for difficulty urinating and hematuria.   Musculoskeletal: Negative for gait problem.   Skin: Negative for color change and rash.        PHQ-9 Depression Screening  Little interest or pleasure in doing things?     Feeling down, depressed, or hopeless?     Trouble falling or staying asleep, or sleeping too much?     Feeling tired or having little energy?     Poor appetite or overeating?     Feeling bad about yourself - or that you are a failure or have let yourself or your family down?     Trouble concentrating on things, such as reading the newspaper or watching television?     Moving or speaking so slowly that other people could have noticed? Or the opposite - being so fidgety or restless that you have been moving around a lot more than usual?     Thoughts that you would be better off dead, or of hurting yourself in some way?     PHQ-9 Total Score     If you checked off any problems, how difficult have these problems made it for you to do your work, take care of things at home, or get along with other people?           Objective   /80 (BP Location: Left arm, Patient Position: Sitting, Cuff Size: Adult)   Pulse 82   Temp 98.1 °F (36.7 °C) (Oral)   Resp 14   Ht 165.1 cm (65\")   Wt 76.7 kg (169 lb)   SpO2 98%   BMI 28.12 kg/m²   Physical Exam   Constitutional: She is oriented to person, place, and time. She appears well-developed and well-nourished. No distress.   HENT:   Head: Normocephalic and atraumatic.   Right Ear: External ear normal.   Left Ear: External ear normal. "   Nose: Nose normal.   Mouth/Throat: Oropharynx is clear and moist. No oropharyngeal exudate.   Eyes: Lids are normal. Right eye exhibits no discharge. Left eye exhibits no discharge. No scleral icterus.   Neck: Trachea normal, normal range of motion and full passive range of motion without pain. Neck supple. No tracheal deviation and no edema present. No thyromegaly present.   Cardiovascular: Normal rate, regular rhythm, normal heart sounds and intact distal pulses. Exam reveals no gallop and no friction rub.   No murmur heard.  Pulmonary/Chest: Effort normal and breath sounds normal. No stridor. No tachypnea and no bradypnea. No respiratory distress. She has no decreased breath sounds. She has no wheezes. She has no rales. She exhibits no tenderness.   Abdominal: Normal appearance. There is no hepatosplenomegaly.   Musculoskeletal: She exhibits no edema.   Lymphadenopathy:        Head (right side): No submental, no submandibular, no tonsillar, no preauricular, no posterior auricular and no occipital adenopathy present.        Head (left side): No submental, no submandibular, no tonsillar, no preauricular, no posterior auricular and no occipital adenopathy present.     She has no cervical adenopathy.        Right cervical: No superficial cervical, no deep cervical and no posterior cervical adenopathy present.       Left cervical: No superficial cervical, no deep cervical and no posterior cervical adenopathy present.   Neurological: She is alert and oriented to person, place, and time. She has normal strength and normal reflexes. She is not disoriented.   Skin: Skin is warm, dry and intact. Capillary refill takes less than 2 seconds. No rash noted. She is not diaphoretic. No cyanosis or erythema. No pallor. Nails show no clubbing.   Psychiatric: She has a normal mood and affect. Her behavior is normal. Cognition and memory are normal.   Nursing note and vitals reviewed.      Procedures    Assessment/Plan   Diagnoses  and all orders for this visit:    1. Attention deficit hyperactivity disorder (ADHD), predominantly inattentive type (Primary)  Assessment & Plan:  Pt agrees to call his insurance to get a list of covered psychiatrists and then he will call and schedule an appointment for herself.  Pt agrees to call her insurance company and get a list of psychologists/therapists and schedule an appointment and start counseling.        2. Anxiety  Assessment & Plan:  Refer to Psychiatry and Psychology.  Pt agrees to make an appointment with both.    Orders:  -     Ambulatory Referral to Psychology  -     Ambulatory Referral to Psychiatry    3. Chronic migraine  Assessment & Plan:  I recommend she eliminate breads and pastas from her diet. Also eliminate sweetened and artificially sweetened beverages.  Refer to neurology.          Orders:  -     Ambulatory Referral to Neurology    4. Encounter for well adult exam without abnormal findings  -     Comprehensive metabolic panel  -     Lipid Panel w/ Chol/HDL Ratio  -     TSH  -     CBC w AUTO Differential  -     Urinalysis With Microscopic - Urine, Clean Catch    I counseled the patient concerning:    Migraine headaches, anxiety and depression, IBS, GI symptoms, stomach ache, some of the causes and treatments for anxiety and depression, food triggers, identifying anxiety triggers, adverse childhood events, and tools to help or completely alleviate anxiety when it comes.  Risk and benefits of management treatment options  Risk factor reduction  Instructions including follow-up management  Importance of compliance with chosen management options  Patient education.    15 minutes out of 25 minutes face-to-face spent counseling patient extensively on dietary changes, exercise, weight loss, compliance with medications and regular follow-up care.

## 2019-11-12 LAB
ALBUMIN SERPL-MCNC: 4.3 G/DL (ref 3.5–5.5)
ALBUMIN/GLOB SERPL: 1.9 {RATIO} (ref 1.2–2.2)
ALP SERPL-CCNC: 61 IU/L (ref 39–117)
ALT SERPL-CCNC: 11 IU/L (ref 0–32)
APPEARANCE UR: CLEAR
AST SERPL-CCNC: 12 IU/L (ref 0–40)
BACTERIA #/AREA URNS HPF: NORMAL /[HPF]
BASOPHILS # BLD AUTO: 0 X10E3/UL (ref 0–0.2)
BASOPHILS NFR BLD AUTO: 0 %
BILIRUB SERPL-MCNC: <0.2 MG/DL (ref 0–1.2)
BILIRUB UR QL STRIP: NEGATIVE
BUN SERPL-MCNC: 9 MG/DL (ref 6–20)
BUN/CREAT SERPL: 14 (ref 9–23)
CALCIUM SERPL-MCNC: 8.7 MG/DL (ref 8.7–10.2)
CHLORIDE SERPL-SCNC: 108 MMOL/L (ref 96–106)
CHOLEST SERPL-MCNC: 170 MG/DL (ref 100–199)
CHOLEST/HDLC SERPL: 4 RATIO (ref 0–4.4)
CO2 SERPL-SCNC: 18 MMOL/L (ref 20–29)
COLOR UR: YELLOW
CREAT SERPL-MCNC: 0.64 MG/DL (ref 0.57–1)
EOSINOPHIL # BLD AUTO: 0.1 X10E3/UL (ref 0–0.4)
EOSINOPHIL NFR BLD AUTO: 1 %
EPI CELLS #/AREA URNS HPF: NORMAL /HPF
ERYTHROCYTE [DISTWIDTH] IN BLOOD BY AUTOMATED COUNT: 13.5 % (ref 12.3–15.4)
GLOBULIN SER CALC-MCNC: 2.3 G/DL (ref 1.5–4.5)
GLUCOSE SERPL-MCNC: 74 MG/DL (ref 65–99)
GLUCOSE UR QL: NEGATIVE
HCT VFR BLD AUTO: 37.2 % (ref 34–46.6)
HDLC SERPL-MCNC: 42 MG/DL
HGB BLD-MCNC: 12.8 G/DL (ref 11.1–15.9)
HGB UR QL STRIP: NEGATIVE
IMM GRANULOCYTES # BLD AUTO: 0 X10E3/UL (ref 0–0.1)
IMM GRANULOCYTES NFR BLD AUTO: 0 %
KETONES UR QL STRIP: NEGATIVE
LDLC SERPL CALC-MCNC: 106 MG/DL (ref 0–99)
LEUKOCYTE ESTERASE UR QL STRIP: NEGATIVE
LYMPHOCYTES # BLD AUTO: 2.2 X10E3/UL (ref 0.7–3.1)
LYMPHOCYTES NFR BLD AUTO: 25 %
MCH RBC QN AUTO: 29.7 PG (ref 26.6–33)
MCHC RBC AUTO-ENTMCNC: 34.4 G/DL (ref 31.5–35.7)
MCV RBC AUTO: 86 FL (ref 79–97)
MICRO URNS: (no result)
MICRO URNS: (no result)
MONOCYTES # BLD AUTO: 0.6 X10E3/UL (ref 0.1–0.9)
MONOCYTES NFR BLD AUTO: 7 %
MUCOUS THREADS URNS QL MICRO: PRESENT
NEUTROPHILS # BLD AUTO: 5.7 X10E3/UL (ref 1.4–7)
NEUTROPHILS NFR BLD AUTO: 67 %
NITRITE UR QL STRIP: NEGATIVE
PH UR STRIP: 6.5 [PH] (ref 5–7.5)
PLATELET # BLD AUTO: 283 X10E3/UL (ref 150–450)
POTASSIUM SERPL-SCNC: 4.1 MMOL/L (ref 3.5–5.2)
PROT SERPL-MCNC: 6.6 G/DL (ref 6–8.5)
PROT UR QL STRIP: NEGATIVE
RBC # BLD AUTO: 4.31 X10E6/UL (ref 3.77–5.28)
RBC #/AREA URNS HPF: NORMAL /HPF
SODIUM SERPL-SCNC: 140 MMOL/L (ref 134–144)
SP GR UR: 1.02 (ref 1–1.03)
TRIGL SERPL-MCNC: 108 MG/DL (ref 0–149)
TSH SERPL DL<=0.005 MIU/L-ACNC: 1.44 UIU/ML (ref 0.45–4.5)
UROBILINOGEN UR STRIP-MCNC: 0.2 MG/DL (ref 0.2–1)
VLDLC SERPL CALC-MCNC: 22 MG/DL (ref 5–40)
WBC # BLD AUTO: 8.6 X10E3/UL (ref 3.4–10.8)
WBC #/AREA URNS HPF: NORMAL /HPF

## 2019-11-20 ENCOUNTER — TELEPHONE (OUTPATIENT)
Dept: FAMILY MEDICINE CLINIC | Facility: CLINIC | Age: 29
End: 2019-11-20

## 2019-11-20 NOTE — TELEPHONE ENCOUNTER
Please come to the office here today for same-day appointment or go to urgent care for evaluation and management.

## 2019-11-20 NOTE — TELEPHONE ENCOUNTER
Having a cough that is deep in her chest with yellow phlegm. Allergic to Sulfa meds. Pt request antibiotic please

## 2019-11-21 ENCOUNTER — TELEPHONE (OUTPATIENT)
Dept: INTERNAL MEDICINE | Facility: CLINIC | Age: 29
End: 2019-11-21

## 2019-11-21 ENCOUNTER — OFFICE VISIT (OUTPATIENT)
Dept: NEUROLOGY | Facility: CLINIC | Age: 29
End: 2019-11-21

## 2019-11-21 VITALS
DIASTOLIC BLOOD PRESSURE: 66 MMHG | SYSTOLIC BLOOD PRESSURE: 100 MMHG | BODY MASS INDEX: 27.66 KG/M2 | WEIGHT: 166 LBS | HEIGHT: 65 IN | HEART RATE: 102 BPM

## 2019-11-21 DIAGNOSIS — IMO0002 CHRONIC MIGRAINE: Primary | ICD-10-CM

## 2019-11-21 DIAGNOSIS — R29.818 SUSPECTED SLEEP APNEA: ICD-10-CM

## 2019-11-21 PROCEDURE — 99204 OFFICE O/P NEW MOD 45 MIN: CPT | Performed by: NURSE PRACTITIONER

## 2019-11-21 RX ORDER — LORATADINE 10 MG/1
TABLET ORAL
COMMUNITY
Start: 2019-09-01

## 2019-11-21 RX ORDER — KETOROLAC TROMETHAMINE 15.75 MG/1
1 SPRAY, METERED NASAL DAILY PRN
Qty: 5 EACH | Refills: 0 | Status: SHIPPED | OUTPATIENT
Start: 2019-11-21 | End: 2021-12-23

## 2019-11-21 NOTE — TELEPHONE ENCOUNTER
I recommend going to the urgent care now or come to the office here in the morning so that we can evaluate and treat you properly.

## 2019-11-21 NOTE — TELEPHONE ENCOUNTER
Regarding: RE: Non-Urgent Medical Question  Contact: [phone]  ----- Message from [employer], Generic sent at 11/21/2019 12:23 PM EST -----    I am sorry to reply to an unrelated message. I have started a cough that has moved down into my chest. It is the only symptom that I have, besides some drainage out of my nose every now and then. I was wondering if there was something you could call in, or if I would have to make an appt. I’m trying to avoid catching something else in the waiting room.
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
## 2019-11-25 ENCOUNTER — OFFICE VISIT (OUTPATIENT)
Dept: FAMILY MEDICINE CLINIC | Facility: CLINIC | Age: 29
End: 2019-11-25

## 2019-11-25 VITALS
SYSTOLIC BLOOD PRESSURE: 112 MMHG | WEIGHT: 164 LBS | DIASTOLIC BLOOD PRESSURE: 66 MMHG | TEMPERATURE: 98.6 F | OXYGEN SATURATION: 98 % | HEART RATE: 97 BPM | HEIGHT: 65 IN | RESPIRATION RATE: 14 BRPM | BODY MASS INDEX: 27.32 KG/M2

## 2019-11-25 DIAGNOSIS — J06.9 UPPER RESPIRATORY TRACT INFECTION, UNSPECIFIED TYPE: ICD-10-CM

## 2019-11-25 DIAGNOSIS — J01.00 ACUTE NON-RECURRENT MAXILLARY SINUSITIS: Primary | ICD-10-CM

## 2019-11-25 PROCEDURE — 99213 OFFICE O/P EST LOW 20 MIN: CPT | Performed by: FAMILY MEDICINE

## 2019-11-25 RX ORDER — BENZONATATE 100 MG/1
100 CAPSULE ORAL 3 TIMES DAILY PRN
Qty: 30 CAPSULE | Refills: 0 | Status: SHIPPED | OUTPATIENT
Start: 2019-11-25 | End: 2019-12-05

## 2019-11-25 RX ORDER — AMOXICILLIN 500 MG/1
1000 CAPSULE ORAL 3 TIMES DAILY
Qty: 30 CAPSULE | Refills: 0 | Status: SHIPPED | OUTPATIENT
Start: 2019-11-25 | End: 2019-12-05

## 2019-11-25 RX ORDER — ALBUTEROL SULFATE 90 UG/1
2 AEROSOL, METERED RESPIRATORY (INHALATION) EVERY 4 HOURS PRN
Qty: 1 INHALER | Refills: 5 | Status: SHIPPED | OUTPATIENT
Start: 2019-11-25 | End: 2020-10-05

## 2019-11-25 NOTE — PATIENT INSTRUCTIONS
Sinusitis, Adult  Sinusitis is inflammation of your sinuses. Sinuses are hollow spaces in the bones around your face. Your sinuses are located:  · Around your eyes.  · In the middle of your forehead.  · Behind your nose.  · In your cheekbones.  Mucus normally drains out of your sinuses. When your nasal tissues become inflamed or swollen, mucus can become trapped or blocked. This allows bacteria, viruses, and fungi to grow, which leads to infection. Most infections of the sinuses are caused by a virus.  Sinusitis can develop quickly. It can last for up to 4 weeks (acute) or for more than 12 weeks (chronic). Sinusitis often develops after a cold.  What are the causes?  This condition is caused by anything that creates swelling in the sinuses or stops mucus from draining. This includes:  · Allergies.  · Asthma.  · Infection from bacteria or viruses.  · Deformities or blockages in your nose or sinuses.  · Abnormal growths in the nose (nasal polyps).  · Pollutants, such as chemicals or irritants in the air.  · Infection from fungi (rare).  What increases the risk?  You are more likely to develop this condition if you:  · Have a weak body defense system (immune system).  · Do a lot of swimming or diving.  · Overuse nasal sprays.  · Smoke.  What are the signs or symptoms?  The main symptoms of this condition are pain and a feeling of pressure around the affected sinuses. Other symptoms include:  · Stuffy nose or congestion.  · Thick drainage from your nose.  · Swelling and warmth over the affected sinuses.  · Headache.  · Upper toothache.  · A cough that may get worse at night.  · Extra mucus that collects in the throat or the back of the nose (postnasal drip).  · Decreased sense of smell and taste.  · Fatigue.  · A fever.  · Sore throat.  · Bad breath.  How is this diagnosed?  This condition is diagnosed based on:  · Your symptoms.  · Your medical history.  · A physical exam.  · Tests to find out if your condition is  acute or chronic. This may include:  ? Checking your nose for nasal polyps.  ? Viewing your sinuses using a device that has a light (endoscope).  ? Testing for allergies or bacteria.  ? Imaging tests, such as an MRI or CT scan.  In rare cases, a bone biopsy may be done to rule out more serious types of fungal sinus disease.  How is this treated?  Treatment for sinusitis depends on the cause and whether your condition is chronic or acute.  · If caused by a virus, your symptoms should go away on their own within 10 days. You may be given medicines to relieve symptoms. They include:  ? Medicines that shrink swollen nasal passages (topical intranasal decongestants).  ? Medicines that treat allergies (antihistamines).  ? A spray that eases inflammation of the nostrils (topical intranasal corticosteroids).  ? Rinses that help get rid of thick mucus in your nose (nasal saline washes).  · If caused by bacteria, your health care provider may recommend waiting to see if your symptoms improve. Most bacterial infections will get better without antibiotic medicine. You may be given antibiotics if you have:  ? A severe infection.  ? A weak immune system.  · If caused by narrow nasal passages or nasal polyps, you may need to have surgery.  Follow these instructions at home:  Medicines  · Take, use, or apply over-the-counter and prescription medicines only as told by your health care provider. These may include nasal sprays.  · If you were prescribed an antibiotic medicine, take it as told by your health care provider. Do not stop taking the antibiotic even if you start to feel better.  Hydrate and humidify    · Drink enough fluid to keep your urine pale yellow. Staying hydrated will help to thin your mucus.  · Use a cool mist humidifier to keep the humidity level in your home above 50%.  · Inhale steam for 10-15 minutes, 3-4 times a day, or as told by your health care provider. You can do this in the bathroom while a hot shower is  running.  · Limit your exposure to cool or dry air.  Rest  · Rest as much as possible.  · Sleep with your head raised (elevated).  · Make sure you get enough sleep each night.  General instructions    · Apply a warm, moist washcloth to your face 3-4 times a day or as told by your health care provider. This will help with discomfort.  · Wash your hands often with soap and water to reduce your exposure to germs. If soap and water are not available, use hand .  · Do not smoke. Avoid being around people who are smoking (secondhand smoke).  · Keep all follow-up visits as told by your health care provider. This is important.  Contact a health care provider if:  · You have a fever.  · Your symptoms get worse.  · Your symptoms do not improve within 10 days.  Get help right away if:  · You have a severe headache.  · You have persistent vomiting.  · You have severe pain or swelling around your face or eyes.  · You have vision problems.  · You develop confusion.  · Your neck is stiff.  · You have trouble breathing.  Summary  · Sinusitis is soreness and inflammation of your sinuses. Sinuses are hollow spaces in the bones around your face.  · This condition is caused by nasal tissues that become inflamed or swollen. The swelling traps or blocks the flow of mucus. This allows bacteria, viruses, and fungi to grow, which leads to infection.  · If you were prescribed an antibiotic medicine, take it as told by your health care provider. Do not stop taking the antibiotic even if you start to feel better.  · Keep all follow-up visits as told by your health care provider. This is important.  This information is not intended to replace advice given to you by your health care provider. Make sure you discuss any questions you have with your health care provider.  Document Released: 12/18/2006 Document Revised: 05/20/2019 Document Reviewed: 05/20/2019  [employer] Patient Education © 2019 [employer]

## 2019-11-25 NOTE — PROGRESS NOTES
Subjective   Oriana Mina is a 29 y.o. female is here for   Chief Complaint   Patient presents with   • Cough     green phlegm X 1 week   • URI       History of Present Illness     Ms. Mina has a moderate productive cough that started about a week ago, and is not getting worse and has become productive with yellow/green sputum.  She is failed Mucinex.  No fever chills.  She does have a little chest tightness and some cough that comes when she takes a deep breath.  She has some sinus pressure and nasal drainage.    The following portions of the patient's history were reviewed and updated as appropriate: allergies, current medications, past family history, past medical history, past social history, past surgical history and problem list.     reports that she has never smoked. She has never used smokeless tobacco. She reports that she drinks about 1.2 - 1.8 oz of alcohol per week. She reports that she does not use drugs.    Review of Systems   Constitutional: Negative for activity change and unexpected weight change.   HENT: Positive for postnasal drip, sinus pressure, sinus pain and sneezing. Negative for congestion.    Respiratory: Positive for cough. Negative for shortness of breath and wheezing.    Cardiovascular: Negative for chest pain and palpitations.   Gastrointestinal: Negative for abdominal pain, blood in stool and constipation.   Genitourinary: Negative for difficulty urinating and hematuria.   Musculoskeletal: Negative for gait problem.   Skin: Negative for color change and rash.        PHQ-9 Depression Screening  Little interest or pleasure in doing things?     Feeling down, depressed, or hopeless?     Trouble falling or staying asleep, or sleeping too much?     Feeling tired or having little energy?     Poor appetite or overeating?     Feeling bad about yourself - or that you are a failure or have let yourself or your family down?     Trouble concentrating on things, such as reading the  "newspaper or watching television?     Moving or speaking so slowly that other people could have noticed? Or the opposite - being so fidgety or restless that you have been moving around a lot more than usual?     Thoughts that you would be better off dead, or of hurting yourself in some way?     PHQ-9 Total Score     If you checked off any problems, how difficult have these problems made it for you to do your work, take care of things at home, or get along with other people?           Objective   /66 (BP Location: Left arm, Patient Position: Sitting, Cuff Size: Adult)   Pulse 97   Temp 98.6 °F (37 °C) (Oral)   Resp 14   Ht 165.1 cm (65\")   Wt 74.4 kg (164 lb)   SpO2 98%   BMI 27.29 kg/m²   Physical Exam   Constitutional: She is oriented to person, place, and time. She appears well-developed and well-nourished. No distress.   HENT:   Head: Normocephalic and atraumatic.   Right Ear: External ear normal.   Left Ear: External ear normal.   Nose: Right sinus exhibits maxillary sinus tenderness. Left sinus exhibits maxillary sinus tenderness.   Mouth/Throat: Oropharynx is clear and moist. No oropharyngeal exudate.   Eyes: Lids are normal. Right eye exhibits no discharge. Left eye exhibits no discharge. No scleral icterus.   Neck: Trachea normal, normal range of motion and full passive range of motion without pain. Neck supple. No tracheal deviation and no edema present. No thyromegaly present.   Cardiovascular: Normal rate, regular rhythm, normal heart sounds and intact distal pulses. Exam reveals no gallop and no friction rub.   No murmur heard.  Pulmonary/Chest: Effort normal and breath sounds normal. No stridor. No tachypnea and no bradypnea. No respiratory distress. She has no decreased breath sounds. She has no wheezes. She has no rales. She exhibits no tenderness.   Abdominal: Normal appearance. There is no hepatosplenomegaly.   Musculoskeletal: She exhibits no edema.   Lymphadenopathy:        Head " (right side): No submental, no submandibular, no tonsillar, no preauricular, no posterior auricular and no occipital adenopathy present.        Head (left side): No submental, no submandibular, no tonsillar, no preauricular, no posterior auricular and no occipital adenopathy present.     She has no cervical adenopathy.        Right cervical: No superficial cervical, no deep cervical and no posterior cervical adenopathy present.       Left cervical: No superficial cervical, no deep cervical and no posterior cervical adenopathy present.   Neurological: She is alert and oriented to person, place, and time. She has normal strength and normal reflexes. She is not disoriented.   Skin: Skin is warm, dry and intact. Capillary refill takes less than 2 seconds. No rash noted. She is not diaphoretic. No cyanosis or erythema. No pallor. Nails show no clubbing.   Psychiatric: She has a normal mood and affect. Her behavior is normal. Cognition and memory are normal.   Nursing note and vitals reviewed.      Procedures    Assessment/Plan   Diagnoses and all orders for this visit:    1. Acute non-recurrent maxillary sinusitis (Primary)  -     amoxicillin (AMOXIL) 500 MG capsule; Take 2 capsules by mouth 3 (Three) Times a Day for 10 days.  Dispense: 30 capsule; Refill: 0  -     benzonatate (TESSALON PERLES) 100 MG capsule; Take 1 capsule by mouth 3 (Three) Times a Day As Needed for Cough for up to 10 days.  Dispense: 30 capsule; Refill: 0  -     albuterol sulfate  (90 Base) MCG/ACT inhaler; Inhale 2 puffs Every 4 (Four) Hours As Needed for Wheezing.  Dispense: 1 inhaler; Refill: 5    2. Upper respiratory tract infection, unspecified type  -     amoxicillin (AMOXIL) 500 MG capsule; Take 2 capsules by mouth 3 (Three) Times a Day for 10 days.  Dispense: 30 capsule; Refill: 0  -     benzonatate (TESSALON PERLES) 100 MG capsule; Take 1 capsule by mouth 3 (Three) Times a Day As Needed for Cough for up to 10 days.  Dispense: 30  capsule; Refill: 0  -     albuterol sulfate  (90 Base) MCG/ACT inhaler; Inhale 2 puffs Every 4 (Four) Hours As Needed for Wheezing.  Dispense: 1 inhaler; Refill: 5

## 2019-11-27 ENCOUNTER — TELEPHONE (OUTPATIENT)
Dept: NEUROLOGY | Facility: CLINIC | Age: 29
End: 2019-11-27

## 2019-12-02 NOTE — TELEPHONE ENCOUNTER
Returned call to [facility]. Was informed fax correspondence was sent over regarding medication.

## 2019-12-11 ENCOUNTER — OFFICE VISIT (OUTPATIENT)
Dept: FAMILY MEDICINE CLINIC | Facility: CLINIC | Age: 29
End: 2019-12-11

## 2019-12-11 VITALS
BODY MASS INDEX: 27.32 KG/M2 | SYSTOLIC BLOOD PRESSURE: 114 MMHG | DIASTOLIC BLOOD PRESSURE: 66 MMHG | HEART RATE: 95 BPM | HEIGHT: 65 IN | WEIGHT: 164 LBS | RESPIRATION RATE: 14 BRPM | OXYGEN SATURATION: 98 % | TEMPERATURE: 97.6 F

## 2019-12-11 DIAGNOSIS — Z00.00 ENCOUNTER FOR WELL ADULT EXAM WITHOUT ABNORMAL FINDINGS: ICD-10-CM

## 2019-12-11 DIAGNOSIS — M54.2 NECK PAIN: Primary | ICD-10-CM

## 2019-12-11 DIAGNOSIS — K58.0 IRRITABLE BOWEL SYNDROME WITH DIARRHEA: ICD-10-CM

## 2019-12-11 DIAGNOSIS — E66.3 OVERWEIGHT (BMI 25.0-29.9): ICD-10-CM

## 2019-12-11 DIAGNOSIS — R53.83 FATIGUE, UNSPECIFIED TYPE: ICD-10-CM

## 2019-12-11 DIAGNOSIS — E78.2 MIXED HYPERLIPIDEMIA: ICD-10-CM

## 2019-12-11 DIAGNOSIS — F41.9 ANXIETY: ICD-10-CM

## 2019-12-11 DIAGNOSIS — E03.8 OTHER SPECIFIED HYPOTHYROIDISM: ICD-10-CM

## 2019-12-11 PROCEDURE — 99213 OFFICE O/P EST LOW 20 MIN: CPT | Performed by: FAMILY MEDICINE

## 2019-12-11 PROCEDURE — 99395 PREV VISIT EST AGE 18-39: CPT | Performed by: FAMILY MEDICINE

## 2019-12-11 RX ORDER — LEVOTHYROXINE SODIUM 0.1 MG/1
100 TABLET ORAL DAILY
Qty: 90 TABLET | Refills: 0 | Status: SHIPPED | OUTPATIENT
Start: 2019-12-11 | End: 2020-04-10

## 2019-12-11 RX ORDER — BUPROPION HYDROCHLORIDE 300 MG/1
300 TABLET ORAL DAILY
Qty: 30 TABLET | Refills: 6 | Status: CANCELLED | OUTPATIENT
Start: 2019-12-11

## 2019-12-11 NOTE — PROGRESS NOTES
"Subjective   Oriana Mina is a 29 y.o. female is here for annual physical exam.    Chief Complaint   Patient presents with   • Annual Exam     tingling in hands and feet       History of Present Illness     Mrs. Mina is here for her annual physical exam.    She states a few times a week she gets some tingling feeling in her hands, like when your hands get cold and then start warm up and start to tingle.  It happens her feet sometimes to, but usually in her hands.  She also has some neck pain that comes and goes.    She has been seeing Dr. Pratt. She has an appointment with a psychiatrist on 12-26-19.  She called [facility] and states Basia Kortney and Alexjesusita were rude to her.  She was on Wellbutrin before and when she stopped taking it she felt \"wierd.\"    The following portions of the patient's history were reviewed and updated as appropriate: allergies, current medications, past family history, past medical history, past social history, past surgical history and problem list.    Family History   Problem Relation Age of Onset   • Cleft palate Daughter    • Spina bifida Daughter    • Congenital heart disease Daughter         TOF   • Seizures Daughter    • Stroke Daughter    • No Known Problems Mother    • Hypertension Father    • Asthma Brother    • Congenital heart disease Sister         TOF    • Asthma Sister    • Stroke Sister    • Thyroid disease Sister    • Arthritis Maternal Grandmother    • Arthritis Maternal Grandfather    • Neuropathy Paternal Grandmother    • Diabetes Paternal Grandfather    • Kidney disease Paternal Grandfather    • Stroke Paternal Grandfather        Social History     Socioeconomic History   • Marital status:      Spouse name: Not on file   • Number of children: Not on file   • Years of education: Not on file   • Highest education level: Not on file   Tobacco Use   • Smoking status: Never Smoker   • Smokeless tobacco: Never Used   Substance and Sexual Activity "   • Alcohol use: Yes     Alcohol/week: 2.0 - 3.0 standard drinks     Types: 1 Glasses of wine, 1 Cans of beer per week     Comment: occasional    • Drug use: No   • Sexual activity: Yes     Partners: Male         Current Outpatient Medications:   •  albuterol sulfate  (90 Base) MCG/ACT inhaler, Inhale 2 puffs Every 4 (Four) Hours As Needed for Wheezing., Disp: 1 inhaler, Rfl: 5  •  BIOTIN PO, Take  by mouth Daily., Disp: , Rfl:   •  buPROPion XL (WELLBUTRIN XL) 300 MG 24 hr tablet, Take 1 tablet by mouth Daily., Disp: 30 tablet, Rfl: 6  •  Cholecalciferol (VITAMIN D3 PO), Take  by mouth Daily., Disp: , Rfl:   •  Etonogestrel (NEXPLANON) 68 MG implant subdermal implant, Inject 1 each into the skin 1 (One) Time., Disp: , Rfl:   •  ibuprofen (ADVIL,MOTRIN) 800 MG tablet, , Disp: , Rfl: 0  •  Ketorolac Tromethamine 15.75 MG/SPRAY solution, 1 spray into the nostril(s) as directed by provider Daily As Needed (As needed at onset of headache, may repeat every 8 hours, max 3x/day)., Disp: 5 each, Rfl: 0  •  loratadine (CLARITIN) 10 MG tablet, , Disp: , Rfl:   •  montelukast (SINGULAIR) 10 MG tablet, Take 1 tablet by mouth Every Night., Disp: 30 tablet, Rfl: 6  •  topiramate (TOPAMAX) 100 MG tablet, Take 1 tablet by mouth Daily., Disp: 30 tablet, Rfl: 5  •  levothyroxine (SYNTHROID, LEVOTHROID) 100 MCG tablet, Take 1 tablet by mouth Daily., Disp: 90 tablet, Rfl: 0    Review of Systems   Constitutional: Negative for activity change, chills, fever and unexpected weight change.   HENT: Negative for congestion.    Eyes: Negative for visual disturbance.   Respiratory: Negative for shortness of breath.    Cardiovascular: Negative for chest pain and palpitations.   Gastrointestinal: Negative for abdominal pain and blood in stool.   Endocrine: Negative for cold intolerance and heat intolerance.   Genitourinary: Negative for difficulty urinating and hematuria.   Musculoskeletal: Negative for gait problem.   Skin: Negative for  color change.   Allergic/Immunologic: Negative for immunocompromised state.   Neurological: Negative for weakness and light-headedness.   Hematological: Negative for adenopathy.   Psychiatric/Behavioral: Negative for sleep disturbance. The patient is not nervous/anxious.        PHQ-9 Depression Screening  Little interest or pleasure in doing things?     Feeling down, depressed, or hopeless?     Trouble falling or staying asleep, or sleeping too much?     Feeling tired or having little energy?     Poor appetite or overeating?     Feeling bad about yourself - or that you are a failure or have let yourself or your family down?     Trouble concentrating on things, such as reading the newspaper or watching television?     Moving or speaking so slowly that other people could have noticed? Or the opposite - being so fidgety or restless that you have been moving around a lot more than usual?     Thoughts that you would be better off dead, or of hurting yourself in some way?     PHQ-9 Total Score     If you checked off any problems, how difficult have these problems made it for you to do your work, take care of things at home, or get along with other people?         Objective   Vitals:    12/11/19 1201   BP: 114/66   Pulse: 95   Resp: 14   Temp: 97.6 °F (36.4 °C)   SpO2: 98%     Physical Exam   Constitutional: She is oriented to person, place, and time. She appears well-developed and well-nourished. No distress.   HENT:   Head: Normocephalic and atraumatic.   Right Ear: External ear normal.   Left Ear: External ear normal.   Nose: Nose normal.   Mouth/Throat: Oropharynx is clear and moist. No oropharyngeal exudate.   Eyes: Lids are normal. Right eye exhibits no discharge. Left eye exhibits no discharge. No scleral icterus.   Neck: Trachea normal, normal range of motion and full passive range of motion without pain. Neck supple. No tracheal deviation and no edema present. No thyromegaly present.   Cardiovascular: Normal rate,  regular rhythm, normal heart sounds and intact distal pulses. Exam reveals no gallop and no friction rub.   No murmur heard.  Pulmonary/Chest: Effort normal and breath sounds normal. No stridor. No tachypnea and no bradypnea. No respiratory distress. She has no decreased breath sounds. She has no wheezes. She has no rales. She exhibits no tenderness.   Abdominal: Normal appearance. There is no hepatosplenomegaly.   Musculoskeletal: She exhibits no edema.   Lymphadenopathy:        Head (right side): No submental, no submandibular, no tonsillar, no preauricular, no posterior auricular and no occipital adenopathy present.        Head (left side): No submental, no submandibular, no tonsillar, no preauricular, no posterior auricular and no occipital adenopathy present.     She has no cervical adenopathy.        Right cervical: No superficial cervical, no deep cervical and no posterior cervical adenopathy present.       Left cervical: No superficial cervical, no deep cervical and no posterior cervical adenopathy present.   Neurological: She is alert and oriented to person, place, and time. She has normal strength and normal reflexes. She is not disoriented.   Skin: Skin is warm, dry and intact. Capillary refill takes less than 2 seconds. No rash noted. She is not diaphoretic. No cyanosis or erythema. No pallor. Nails show no clubbing.   Psychiatric: She has a normal mood and affect. Her behavior is normal. Cognition and memory are normal.   Nursing note and vitals reviewed.      Procedures      Assessment/Plan   Diagnoses and all orders for this visit:    1. Neck pain (Primary)  Assessment & Plan:  X-ray of the cervical spine    Orders:  -     XR Spine Cervical 3 View; Future    2. Overweight (BMI 25.0-29.9)    3. Other specified hypothyroidism  -     levothyroxine (SYNTHROID, LEVOTHROID) 100 MCG tablet; Take 1 tablet by mouth Daily.  Dispense: 90 tablet; Refill: 0    Other orders  -     Cancel: buPROPion XL (WELLBUTRIN  XL) 300 MG 24 hr tablet; Take 1 tablet by mouth Daily.  Dispense: 30 tablet; Refill: 6

## 2019-12-11 NOTE — PATIENT INSTRUCTIONS

## 2019-12-12 RX ORDER — BUPROPION HYDROCHLORIDE 300 MG/1
300 TABLET ORAL DAILY
Qty: 30 TABLET | Refills: 0 | Status: SHIPPED | OUTPATIENT
Start: 2019-12-12 | End: 2020-02-21 | Stop reason: SDUPTHER

## 2020-01-07 ENCOUNTER — HOSPITAL ENCOUNTER (OUTPATIENT)
Dept: GENERAL RADIOLOGY | Facility: HOSPITAL | Age: 30
Discharge: HOME | End: 2020-01-07
Admitting: FAMILY MEDICINE

## 2020-01-07 DIAGNOSIS — M54.2 NECK PAIN: ICD-10-CM

## 2020-01-07 PROCEDURE — 72040 X-RAY EXAM NECK SPINE 2-3 VW: CPT

## 2020-01-07 NOTE — PROGRESS NOTES
Please call the patient regarding her result(s).  Please let her know that her x-ray of cervical spine is normal.

## 2020-01-14 RX ORDER — TOPIRAMATE 100 MG/1
100 TABLET, FILM COATED ORAL DAILY
Qty: 30 TABLET | Refills: 5 | OUTPATIENT
Start: 2020-01-14

## 2020-01-14 RX ORDER — TOPIRAMATE 100 MG/1
100 TABLET, FILM COATED ORAL DAILY
Qty: 30 TABLET | Refills: 5 | Status: SHIPPED | OUTPATIENT
Start: 2020-01-14

## 2020-02-11 ENCOUNTER — OFFICE VISIT (OUTPATIENT)
Dept: FAMILY MEDICINE CLINIC | Facility: CLINIC | Age: 30
End: 2020-02-11

## 2020-02-11 VITALS
DIASTOLIC BLOOD PRESSURE: 72 MMHG | HEIGHT: 65 IN | OXYGEN SATURATION: 98 % | WEIGHT: 170 LBS | HEART RATE: 80 BPM | BODY MASS INDEX: 28.32 KG/M2 | SYSTOLIC BLOOD PRESSURE: 108 MMHG | TEMPERATURE: 98.1 F

## 2020-02-11 DIAGNOSIS — J02.9 ACUTE PHARYNGITIS, UNSPECIFIED ETIOLOGY: ICD-10-CM

## 2020-02-11 DIAGNOSIS — H10.31 ACUTE BACTERIAL CONJUNCTIVITIS OF RIGHT EYE: Primary | ICD-10-CM

## 2020-02-11 PROCEDURE — 99213 OFFICE O/P EST LOW 20 MIN: CPT | Performed by: FAMILY MEDICINE

## 2020-02-11 RX ORDER — AMOXICILLIN 500 MG/1
500 CAPSULE ORAL 3 TIMES DAILY
Qty: 21 CAPSULE | Refills: 0 | Status: SHIPPED | OUTPATIENT
Start: 2020-02-11 | End: 2020-02-18

## 2020-02-11 RX ORDER — OFLOXACIN 3 MG/ML
1 SOLUTION/ DROPS OPHTHALMIC 4 TIMES DAILY
Qty: 5 ML | Refills: 0 | Status: SHIPPED | OUTPATIENT
Start: 2020-02-11 | End: 2020-02-18

## 2020-02-11 NOTE — PROGRESS NOTES
Subjective   Oriana Mina is a 29 y.o. female is here for   Chief Complaint   Patient presents with   • Neck Pain     Follow Up from tingling in fingers   • URI     Head constion, stuffy nose, ear pressue, Sinus drainage       History of Present Illness     She states she woke up Sunday morning and her right eye was crusted shut.  This morning the same thing happened.  Over this past weekend she developed some nasal drainage it was going down the back of her throat that she states is giving her a sore throat.  No fever or chills.  She does feel fatigued.  Is, achy but she is moving so she not sure whether that is due to all the activity or being ill or lack of sleep from staying up late.  She is also feeling some pressure in her ears.  She had some mild nausea yesterday.  No vomiting or diarrhea.    She states her hands and fingers sometimes feel restless.  That started in January 2020.  It comes and goes.  It happens sometimes when she is playing cards and coloring, or writing.  The more she uses her hands and fingers the more likely this restless feeling that she wants to check out of her hands happens.    The following portions of the patient's history were reviewed and updated as appropriate: allergies, current medications, past family history, past medical history, past social history, past surgical history and problem list.     reports that she has never smoked. She has never used smokeless tobacco. She reports that she drinks about 2.0 - 3.0 standard drinks of alcohol per week. She reports that she does not use drugs.    Review of Systems     PHQ-9 Depression Screening  Little interest or pleasure in doing things?     Feeling down, depressed, or hopeless?     Trouble falling or staying asleep, or sleeping too much?     Feeling tired or having little energy?     Poor appetite or overeating?     Feeling bad about yourself - or that you are a failure or have let yourself or your family down?     Trouble  "concentrating on things, such as reading the newspaper or watching television?     Moving or speaking so slowly that other people could have noticed? Or the opposite - being so fidgety or restless that you have been moving around a lot more than usual?     Thoughts that you would be better off dead, or of hurting yourself in some way?     PHQ-9 Total Score     If you checked off any problems, how difficult have these problems made it for you to do your work, take care of things at home, or get along with other people?           Objective   /72   Pulse 80   Temp 98.1 °F (36.7 °C)   Ht 165.1 cm (65\")   Wt 77.1 kg (170 lb)   SpO2 98%   BMI 28.29 kg/m²   Physical Exam   Constitutional: She is oriented to person, place, and time. She appears well-developed and well-nourished. No distress.   HENT:   Head: Normocephalic and atraumatic.   Right Ear: External ear normal.   Left Ear: External ear normal.   Nose: Nose normal.   Mouth/Throat: Mucous membranes are normal. Posterior oropharyngeal erythema present. No oropharyngeal exudate. Tonsils are 0 on the right. Tonsils are 0 on the left. No tonsillar exudate.   Eyes: Lids are normal. Right eye exhibits no discharge. Left eye exhibits no discharge. Right conjunctiva is injected. No scleral icterus.   Mild yellow discharge from right eye   Neck: Trachea normal, normal range of motion and full passive range of motion without pain. Neck supple. No tracheal deviation and no edema present. No thyromegaly present.   Cardiovascular: Normal rate, regular rhythm, normal heart sounds and intact distal pulses. Exam reveals no gallop and no friction rub.   No murmur heard.  Pulmonary/Chest: Effort normal and breath sounds normal. No stridor. No tachypnea and no bradypnea. No respiratory distress. She has no decreased breath sounds. She has no wheezes. She has no rales. She exhibits no tenderness.   Abdominal: Normal appearance. There is no hepatosplenomegaly. "   Musculoskeletal: She exhibits no edema.   Lymphadenopathy:        Head (right side): No submental, no submandibular, no tonsillar, no preauricular, no posterior auricular and no occipital adenopathy present.        Head (left side): No submental, no submandibular, no tonsillar, no preauricular, no posterior auricular and no occipital adenopathy present.     She has no cervical adenopathy.        Right cervical: No superficial cervical, no deep cervical and no posterior cervical adenopathy present.       Left cervical: No superficial cervical, no deep cervical and no posterior cervical adenopathy present.   Neurological: She is alert and oriented to person, place, and time. She has normal strength and normal reflexes. She is not disoriented.   Skin: Skin is warm, dry and intact. Capillary refill takes less than 2 seconds. No rash noted. She is not diaphoretic. No cyanosis or erythema. No pallor. Nails show no clubbing.   Psychiatric: She has a normal mood and affect. Her behavior is normal. Cognition and memory are normal.   Nursing note and vitals reviewed.      Procedures    Assessment/Plan   Diagnoses and all orders for this visit:    1. Acute bacterial conjunctivitis of right eye (Primary)  -     ofloxacin (OCUFLOX) 0.3 % ophthalmic solution; Administer 1 drop to both eyes 4 (Four) Times a Day for 7 days.  Dispense: 5 mL; Refill: 0    2. Acute pharyngitis, unspecified etiology  -     amoxicillin (AMOXIL) 500 MG capsule; Take 1 capsule by mouth 3 (Three) Times a Day for 7 days.  Dispense: 21 capsule; Refill: 0

## 2020-02-11 NOTE — PATIENT INSTRUCTIONS
Bacterial Conjunctivitis, Adult  Bacterial conjunctivitis is an infection of the clear membrane that covers the white part of your eye and the inner surface of your eyelid (conjunctiva). When the blood vessels in your conjunctiva become inflamed, your eye becomes red or pink, and it will probably feel itchy. Bacterial conjunctivitis spreads very easily from person to person (is contagious). It also spreads easily from one eye to the other eye.  What are the causes?  This condition is caused by bacteria. You may get the infection if you come into close contact with:  · A person who is infected with the bacteria.  · Items that are contaminated with the bacteria, such as a face towel, contact lens solution, or eye makeup.  What increases the risk?  You are more likely to develop this condition if you:  · Are exposed to other people who have the infection.  · Wear contact lenses.  · Have a sinus infection.  · Have had a recent eye injury or surgery.  · Have a weak body defense system (immune system).  · Have a medical condition that causes dry eyes.  What are the signs or symptoms?  Symptoms of this condition include:  · Thick, yellowish discharge from the eye. This may turn into a crust on the eyelid overnight and cause your eyelids to stick together.  · Tearing or watery eyes.  · Itchy eyes.  · Burning feeling in your eyes.  · Eye redness.  · Swollen eyelids.  · Blurred vision.  How is this diagnosed?  This condition is diagnosed based on your symptoms and medical history. Your health care provider may also take a sample of discharge from your eye to find the cause of your infection. This is rarely done.  How is this treated?  This condition may be treated with:  · Antibiotic eye drops or ointment to clear the infection more quickly and prevent the spread of infection to others.  · Oral antibiotic medicines to treat infections that do not respond to drops or ointments or that last longer than 10 days.  · Cool, wet  cloths (cool compresses) placed on the eyes.  · Artificial tears applied 2-6 times a day.  Follow these instructions at home:  Medicines  · Take or apply your antibiotic medicine as told by your health care provider. Do not stop taking or applying the antibiotic even if you start to feel better.  · Take or apply over-the-counter and prescription medicines only as told by your health care provider.  · Be very careful to avoid touching the edge of your eyelid with the eye-drop bottle or the ointment tube when you apply medicines to the affected eye. This will keep you from spreading the infection to your other eye or to other people.  Managing discomfort  · Gently wipe away any drainage from your eye with a warm, wet washcloth or a cotton ball.  · Apply a clean, cool compress to your eye for 10-20 minutes, 3-4 times a day.  General instructions  · Do not wear contact lenses until the inflammation is gone and your health care provider says it is safe to wear them again. Ask your health care provider how to sterilize or replace your contact lenses before you use them again. Wear glasses until you can resume wearing contact lenses.  · Avoid wearing eye makeup until the inflammation is gone. Throw away any old eye cosmetics that may be contaminated.  · Change or wash your pillowcase every day.  · Do not share towels or washcloths. This may spread the infection.  · Wash your hands often with soap and water. Use paper towels to dry your hands.  · Avoid touching or rubbing your eyes.  · Do not drive or use heavy machinery if your vision is blurred.  Contact a health care provider if:  · You have a fever.  · Your symptoms do not get better after 10 days.  Get help right away if you have:  · A fever and your symptoms suddenly get worse.  · Severe pain when you move your eye.  · Facial pain, redness, or swelling.  · Sudden loss of vision.  Summary  · Bacterial conjunctivitis is an infection of the clear membrane that covers the  white part of your eye and the inner surface of your eyelid (conjunctiva).  · Bacterial conjunctivitis spreads very easily from person to person (is contagious).  · Wash your hands often with soap and water. Use paper towels to dry your hands.  · Take or apply your antibiotic medicine as told by your health care provider. Do not stop taking or applying the antibiotic even if you start to feel better.  · Contact a health care provider if you have a fever or your symptoms do not get better after 10 days.  This information is not intended to replace advice given to you by your health care provider. Make sure you discuss any questions you have with your health care provider.  Document Released: 12/18/2006 Document Revised: 07/24/2019 Document Reviewed: 07/24/2019  [employer]vier Interactive Patient Education © 2019 [employer]

## 2020-02-17 ENCOUNTER — TELEPHONE (OUTPATIENT)
Dept: FAMILY MEDICINE CLINIC | Facility: CLINIC | Age: 30
End: 2020-02-17

## 2020-02-18 RX ORDER — BUPROPION HYDROCHLORIDE 300 MG/1
300 TABLET ORAL DAILY
Qty: 30 TABLET | Refills: 0 | OUTPATIENT
Start: 2020-02-18

## 2020-02-18 NOTE — TELEPHONE ENCOUNTER
Called patient to see how her symptoms were today and offered appointment, she stated they were the same and she would not be making an appointment for the exact same symptoms. Her deductible is too high with her insurance and she will not be coming in

## 2020-02-21 ENCOUNTER — TELEPHONE (OUTPATIENT)
Dept: FAMILY MEDICINE CLINIC | Facility: CLINIC | Age: 30
End: 2020-02-21

## 2020-02-21 RX ORDER — BUPROPION HYDROCHLORIDE 300 MG/1
300 TABLET ORAL DAILY
Qty: 30 TABLET | Refills: 3 | Status: SHIPPED | OUTPATIENT
Start: 2020-02-21

## 2020-02-21 NOTE — TELEPHONE ENCOUNTER
Patient cx her 1100am appointment due to conflict of the time and work. She was rs for Monday 02/26/2020

## 2020-02-24 ENCOUNTER — TELEPHONE (OUTPATIENT)
Dept: FAMILY MEDICINE CLINIC | Facility: CLINIC | Age: 30
End: 2020-02-24

## 2020-02-24 ENCOUNTER — OFFICE VISIT (OUTPATIENT)
Dept: FAMILY MEDICINE CLINIC | Facility: CLINIC | Age: 30
End: 2020-02-24

## 2020-02-24 VITALS
WEIGHT: 173 LBS | SYSTOLIC BLOOD PRESSURE: 102 MMHG | HEART RATE: 82 BPM | HEIGHT: 65 IN | TEMPERATURE: 98.5 F | DIASTOLIC BLOOD PRESSURE: 60 MMHG | BODY MASS INDEX: 28.82 KG/M2 | OXYGEN SATURATION: 99 %

## 2020-02-24 DIAGNOSIS — Z53.21 PATIENT LEFT WITHOUT BEING SEEN: Primary | ICD-10-CM

## 2020-02-24 NOTE — TELEPHONE ENCOUNTER
Oriana left without being seen.  She stated both time she has been here she has had to wait to be seen.  She was on her lunch break.  She left very unhappy.  MELISSA Arellano CMA

## 2020-02-24 NOTE — PROGRESS NOTES
Subjective   Oriana Mina is a 29 y.o. female is here for   Chief Complaint   Patient presents with   • Cough   • URI   • Consult     iron levels and joint pain   • Pain       History of Present Illness     Patient left without being seen.    The following portions of the patient's history were reviewed and updated as appropriate: allergies, current medications, past family history, past medical history, past social history, past surgical history and problem list.     reports that she has never smoked. She has never used smokeless tobacco. She reports that she drinks about 2.0 - 3.0 standard drinks of alcohol per week. She reports that she does not use drugs.    Review of Systems   Constitutional: Negative for activity change and unexpected weight change.   Respiratory: Negative for shortness of breath and wheezing.    Cardiovascular: Negative for chest pain and palpitations.   Gastrointestinal: Negative for abdominal pain, blood in stool and constipation.   Genitourinary: Negative for difficulty urinating and hematuria.   Musculoskeletal: Negative for gait problem.   Skin: Negative for color change and rash.        PHQ-9 Depression Screening  Little interest or pleasure in doing things?     Feeling down, depressed, or hopeless?     Trouble falling or staying asleep, or sleeping too much?     Feeling tired or having little energy?     Poor appetite or overeating?     Feeling bad about yourself - or that you are a failure or have let yourself or your family down?     Trouble concentrating on things, such as reading the newspaper or watching television?     Moving or speaking so slowly that other people could have noticed? Or the opposite - being so fidgety or restless that you have been moving around a lot more than usual?     Thoughts that you would be better off dead, or of hurting yourself in some way?     PHQ-9 Total Score     If you checked off any problems, how difficult have these problems made it for  "you to do your work, take care of things at home, or get along with other people?           Objective   /60 (BP Location: Left arm, Patient Position: Sitting, Cuff Size: Adult)   Pulse 82   Temp 98.5 °F (36.9 °C) (Oral)   Ht 165.1 cm (65\")   Wt 78.5 kg (173 lb)   SpO2 99%   BMI 28.79 kg/m²   Physical Exam    Procedures    Assessment/Plan   There are no diagnoses linked to this encounter.       Patient left without being seen.  No charge.  "

## 2020-03-23 ENCOUNTER — E-VISIT (OUTPATIENT)
Dept: RETAIL CLINIC | Facility: CLINIC | Age: 30
End: 2020-03-23

## 2020-03-23 DIAGNOSIS — R05.9 COUGH: Primary | ICD-10-CM

## 2020-03-23 DIAGNOSIS — J34.89 RHINORRHEA: ICD-10-CM

## 2020-03-23 DIAGNOSIS — R53.83 FATIGUE, UNSPECIFIED TYPE: ICD-10-CM

## 2020-03-23 DIAGNOSIS — J02.9 SORE THROAT: ICD-10-CM

## 2020-03-23 PROCEDURE — 99422 OL DIG E/M SVC 11-20 MIN: CPT | Performed by: NURSE PRACTITIONER

## 2020-03-23 NOTE — PATIENT INSTRUCTIONS
Recommend trying mucinex for chest congestion and flonase for drainage. If symptoms worsen, see primary care or urgent care.    Cough, Adult    Coughing is a reflex that clears your throat and your airways. Coughing helps to heal and protect your lungs. It is normal to cough occasionally, but a cough that happens with other symptoms or lasts a long time may be a sign of a condition that needs treatment. A cough may last only 2-3 weeks (acute), or it may last longer than 8 weeks (chronic).  What are the causes?  Coughing is commonly caused by:  · Breathing in substances that irritate your lungs.  · A viral or bacterial respiratory infection.  · Allergies.  · Asthma.  · Postnasal drip.  · Smoking.  · Acid backing up from the stomach into the esophagus (gastroesophageal reflux).  · Certain medicines.  · Chronic lung problems, including COPD (or rarely, lung cancer).  · Other medical conditions such as heart failure.  Follow these instructions at home:  Pay attention to any changes in your symptoms. Take these actions to help with your discomfort:  · Take medicines only as told by your health care provider.  ? If you were prescribed an antibiotic medicine, take it as told by your health care provider. Do not stop taking the antibiotic even if you start to feel better.  ? Talk with your health care provider before you take a cough suppressant medicine.  · Drink enough fluid to keep your urine clear or pale yellow.  · If the air is dry, use a cold steam vaporizer or humidifier in your bedroom or your home to help loosen secretions.  · Avoid anything that causes you to cough at work or at home.  · If your cough is worse at night, try sleeping in a semi-upright position.  · Avoid cigarette smoke. If you smoke, quit smoking. If you need help quitting, ask your health care provider.  · Avoid caffeine.  · Avoid alcohol.  · Rest as needed.  Contact a health care provider if:  · You have new symptoms.  · You cough up  pus.  · Your cough does not get better after 2-3 weeks, or your cough gets worse.  · You cannot control your cough with suppressant medicines and you are losing sleep.  · You develop pain that is getting worse or pain that is not controlled with pain medicines.  · You have a fever.  · You have unexplained weight loss.  · You have night sweats.  Get help right away if:  · You cough up blood.  · You have difficulty breathing.  · Your heartbeat is very fast.  This information is not intended to replace advice given to you by your health care provider. Make sure you discuss any questions you have with your health care provider.  Document Released: 06/15/2012 Document Revised: 05/25/2017 Document Reviewed: 02/24/2016  [employer] Interactive Patient Education © 2019 [employer].

## 2020-03-23 NOTE — PROGRESS NOTES
Questionnaire reviewed. Negative COVID-19 screen but discussed what to watch for and provided health department hotline number. Recommend over the counter mucinex and flonase for symptomatic relief and encouraged her to see her PCP or urgent care for any fever or worsening symptoms. Detailed instructions provided in AVS including when to follow up.    I spent 11-20 minutes in the patient's chart.   none

## 2020-04-02 ENCOUNTER — TELEMEDICINE (OUTPATIENT)
Dept: FAMILY MEDICINE CLINIC | Facility: TELEHEALTH | Age: 30
End: 2020-04-02

## 2020-04-02 DIAGNOSIS — J40 BRONCHITIS: Primary | ICD-10-CM

## 2020-04-02 PROCEDURE — 99213 OFFICE O/P EST LOW 20 MIN: CPT | Performed by: NURSE PRACTITIONER

## 2020-04-02 RX ORDER — BENZONATATE 200 MG/1
200 CAPSULE ORAL 3 TIMES DAILY PRN
Qty: 28 CAPSULE | Refills: 0 | Status: SHIPPED | OUTPATIENT
Start: 2020-04-02 | End: 2020-10-05

## 2020-04-02 RX ORDER — CEFDINIR 300 MG/1
300 CAPSULE ORAL 2 TIMES DAILY
Qty: 10 CAPSULE | Refills: 0 | Status: SHIPPED | OUTPATIENT
Start: 2020-04-02 | End: 2020-04-07

## 2020-04-02 RX ORDER — GUAIFENESIN 600 MG/1
1200 TABLET, EXTENDED RELEASE ORAL 2 TIMES DAILY
Qty: 28 TABLET | Refills: 0 | Status: SHIPPED | OUTPATIENT
Start: 2020-04-02 | End: 2020-04-09

## 2020-04-02 RX ORDER — METHYLPREDNISOLONE 4 MG/1
TABLET ORAL
Qty: 21 TABLET | Refills: 0 | Status: SHIPPED | OUTPATIENT
Start: 2020-04-02 | End: 2020-10-05

## 2020-04-02 RX ORDER — DEXTROMETHORPHAN HYDROBROMIDE AND PROMETHAZINE HYDROCHLORIDE 15; 6.25 MG/5ML; MG/5ML
5 SYRUP ORAL 4 TIMES DAILY PRN
Qty: 180 ML | Refills: 0 | Status: SHIPPED | OUTPATIENT
Start: 2020-04-02 | End: 2020-10-05

## 2020-04-02 RX ORDER — FLUTICASONE PROPIONATE 50 MCG
2 SPRAY, SUSPENSION (ML) NASAL DAILY
Qty: 1 ML | Refills: 0 | Status: SHIPPED | OUTPATIENT
Start: 2020-04-02 | End: 2020-11-10 | Stop reason: SDUPTHER

## 2020-04-02 NOTE — PROGRESS NOTES
Subjective   Chief Complaint   Patient presents with   • URI     THIS VISIT WAS CONDUCTED VIA VIDEO.     Oriana Mina is a 30 y.o. female.     Pt has been dealing with these symptoms since February. She was diagnosed in February with pharyngitis and was given amoxil. She states the sore throat is better but she now has a cough that is effecting sleep and getting deeper with productive yellow mucus and a lot of PND..     URI    This is a new problem. The current episode started more than 1 month ago. The problem has been waxing and waning. There has been no fever. Associated symptoms include congestion, coughing and sneezing. Pertinent negatives include no rhinorrhea, sore throat or wheezing. Treatments tried: sudafed, multi symptoms cold medicine. The treatment provided no relief.        Allergies   Allergen Reactions   • Relpax [Eletriptan Hydrobromide] Other (See Comments)     Not efficacious   • Sulfa Antibiotics Rash       Past Medical History:   Diagnosis Date   • Abnormal Pap smear of cervix    • Allergic    • Anxiety    • Depression    • Disease of thyroid gland     autoImmune and Hypothroid   • Headache    • Headache, tension-type    • Herpes    • Migraine        Past Surgical History:   Procedure Laterality Date   • ADENOIDECTOMY     • APPENDECTOMY     •  SECTION     •  SECTION N/A 2016    Procedure:  SECTION REPEAT;  Surgeon: Candelaria Johnson MD;  Location: [facility] LABOR DELIVERY;  Service:    • OVARIAN CYST REMOVAL     • TONSILLECTOMY     • WISDOM TOOTH EXTRACTION         Social History     Socioeconomic History   • Marital status:      Spouse name: Not on file   • Number of children: Not on file   • Years of education: Not on file   • Highest education level: Not on file   Tobacco Use   • Smoking status: Never Smoker   • Smokeless tobacco: Never Used   Substance and Sexual Activity   • Alcohol use: Yes     Alcohol/week: 2.0 - 3.0 standard drinks      Types: 1 Glasses of wine, 1 Cans of beer per week     Comment: occasional    • Drug use: No   • Sexual activity: Defer       Family History   Problem Relation Age of Onset   • Cleft palate Daughter    • Spina bifida Daughter    • Congenital heart disease Daughter         TOF   • Seizures Daughter    • Stroke Daughter    • No Known Problems Mother    • Hypertension Father    • Asthma Brother    • Congenital heart disease Sister         TOF    • Asthma Sister    • Stroke Sister    • Thyroid disease Sister    • Arthritis Maternal Grandmother    • Arthritis Maternal Grandfather    • Neuropathy Paternal Grandmother    • Diabetes Paternal Grandfather    • Kidney disease Paternal Grandfather    • Stroke Paternal Grandfather          Current Outpatient Medications:   •  albuterol sulfate  (90 Base) MCG/ACT inhaler, Inhale 2 puffs Every 4 (Four) Hours As Needed for Wheezing., Disp: 1 inhaler, Rfl: 5  •  BIOTIN PO, Take  by mouth Daily., Disp: , Rfl:   •  buPROPion XL (WELLBUTRIN XL) 300 MG 24 hr tablet, Take 1 tablet by mouth Daily., Disp: 30 tablet, Rfl: 3  •  Cholecalciferol (VITAMIN D3 PO), Take  by mouth Daily., Disp: , Rfl:   •  Etonogestrel (NEXPLANON) 68 MG implant subdermal implant, Inject 1 each into the skin 1 (One) Time., Disp: , Rfl:   •  ibuprofen (ADVIL,MOTRIN) 800 MG tablet, , Disp: , Rfl: 0  •  Ketorolac Tromethamine 15.75 MG/SPRAY solution, 1 spray into the nostril(s) as directed by provider Daily As Needed (As needed at onset of headache, may repeat every 8 hours, max 3x/day)., Disp: 5 each, Rfl: 0  •  levothyroxine (SYNTHROID, LEVOTHROID) 100 MCG tablet, Take 1 tablet by mouth Daily., Disp: 90 tablet, Rfl: 0  •  loratadine (CLARITIN) 10 MG tablet, , Disp: , Rfl:   •  montelukast (SINGULAIR) 10 MG tablet, Take 1 tablet by mouth Every Night., Disp: 30 tablet, Rfl: 6  •  topiramate (TOPAMAX) 100 MG tablet, TAKE 1 TABLET BY MOUTH DAILY, Disp: 30 tablet, Rfl: 5  •  benzonatate (TESSALON) 200 MG capsule,  Take 1 capsule by mouth 3 (Three) Times a Day As Needed for Cough., Disp: 28 capsule, Rfl: 0  •  cefdinir (OMNICEF) 300 MG capsule, Take 1 capsule by mouth 2 (Two) Times a Day for 5 days., Disp: 10 capsule, Rfl: 0  •  fluticasone (Flonase) 50 MCG/ACT nasal spray, 2 sprays into the nostril(s) as directed by provider Daily., Disp: 1 mL, Rfl: 0  •  guaiFENesin (Mucinex) 600 MG 12 hr tablet, Take 2 tablets by mouth 2 (Two) Times a Day for 7 days., Disp: 28 tablet, Rfl: 0  •  methylPREDNISolone (MEDROL, FRANCOIS,) 4 MG tablet, Take as directed on package instructions., Disp: 21 tablet, Rfl: 0  •  promethazine-dextromethorphan (PROMETHAZINE-DM) 6.25-15 MG/5ML syrup, Take 5 mL by mouth 4 (Four) Times a Day As Needed for Cough., Disp: 180 mL, Rfl: 0      Review of Systems   Constitutional: Negative for chills, diaphoresis, fatigue and fever.   HENT: Positive for congestion, postnasal drip and sneezing. Negative for rhinorrhea and sore throat.    Respiratory: Positive for cough. Negative for shortness of breath and wheezing.    Musculoskeletal: Negative for myalgias.        There were no vitals filed for this visit.    Objective   Physical Exam   Constitutional: She appears well-developed and well-nourished.   Neurological: She is alert.        Procedures     Assessment/Plan   Oriana was seen today for uri.    Diagnoses and all orders for this visit:    Bronchitis  -     cefdinir (OMNICEF) 300 MG capsule; Take 1 capsule by mouth 2 (Two) Times a Day for 5 days.  -     methylPREDNISolone (MEDROL, FRANCOIS,) 4 MG tablet; Take as directed on package instructions.  -     fluticasone (Flonase) 50 MCG/ACT nasal spray; 2 sprays into the nostril(s) as directed by provider Daily.  -     promethazine-dextromethorphan (PROMETHAZINE-DM) 6.25-15 MG/5ML syrup; Take 5 mL by mouth 4 (Four) Times a Day As Needed for Cough.  -     benzonatate (TESSALON) 200 MG capsule; Take 1 capsule by mouth 3 (Three) Times a Day As Needed for Cough.  -      guaiFENesin (Mucinex) 600 MG 12 hr tablet; Take 2 tablets by mouth 2 (Two) Times a Day for 7 days.            PLAN: Discussed dosing, side effects, recommended other symptomatic care.  Patient should follow up with primary care provider if symptoms worsen, fail to resolve or other symptoms need attention. Patient/family agree to the above.     NELDA Field     I have spent a total of 20 minutes reviewing this chart.

## 2020-04-02 NOTE — PATIENT INSTRUCTIONS
Acute Bronchitis, Adult  Acute bronchitis is when air tubes (bronchi) in the lungs suddenly get swollen. The condition can make it hard to breathe. It can also cause these symptoms:  · A cough.  · Coughing up clear, yellow, or green mucus.  · Wheezing.  · Chest congestion.  · Shortness of breath.  · A fever.  · Body aches.  · Chills.  · A sore throat.  Follow these instructions at home:    Medicines  · Take over-the-counter and prescription medicines only as told by your doctor.  · If you were prescribed an antibiotic medicine, take it as told by your doctor. Do not stop taking the antibiotic even if you start to feel better.  General instructions  · Rest.  · Drink enough fluids to keep your pee (urine) pale yellow.  · Avoid smoking and secondhand smoke. If you smoke and you need help quitting, ask your doctor. Quitting will help your lungs heal faster.  · Use an inhaler, cool mist vaporizer, or humidifier as told by your doctor.  · Keep all follow-up visits as told by your doctor. This is important.  How is this prevented?  To lower your risk of getting this condition again:  · Wash your hands often with soap and water. If you cannot use soap and water, use hand .  · Avoid contact with people who have cold symptoms.  · Try not to touch your hands to your mouth, nose, or eyes.  · Make sure to get the flu shot every year.  Contact a doctor if:  · Your symptoms do not get better in 2 weeks.  Get help right away if:  · You cough up blood.  · You have chest pain.  · You have very bad shortness of breath.  · You become dehydrated.  · You faint (pass out) or keep feeling like you are going to pass out.  · You keep throwing up (vomiting).  · You have a very bad headache.  · Your fever or chills gets worse.  This information is not intended to replace advice given to you by your health care provider. Make sure you discuss any questions you have with your health care provider.  Document Released: 06/05/2009 Document  Revised: 08/01/2018 Document Reviewed: 06/07/2017  [employer] Interactive Patient Education © 2020 [employer] Inc.

## 2020-04-10 DIAGNOSIS — J30.89 ENVIRONMENTAL AND SEASONAL ALLERGIES: Primary | ICD-10-CM

## 2020-04-10 DIAGNOSIS — E03.8 OTHER SPECIFIED HYPOTHYROIDISM: ICD-10-CM

## 2020-04-10 RX ORDER — LEVOTHYROXINE SODIUM 0.1 MG/1
100 TABLET ORAL DAILY
Qty: 90 TABLET | Refills: 0 | Status: SHIPPED | OUTPATIENT
Start: 2020-04-10 | End: 2020-07-14

## 2020-04-10 RX ORDER — MONTELUKAST SODIUM 10 MG/1
10 TABLET ORAL NIGHTLY
Qty: 30 TABLET | Refills: 6 | Status: SHIPPED | OUTPATIENT
Start: 2020-04-10

## 2020-04-10 RX ORDER — MONTELUKAST SODIUM 10 MG/1
10 TABLET ORAL NIGHTLY
Qty: 30 TABLET | Refills: 6 | OUTPATIENT
Start: 2020-04-10

## 2020-05-03 ENCOUNTER — E-VISIT (OUTPATIENT)
Dept: FAMILY MEDICINE CLINIC | Facility: TELEHEALTH | Age: 30
End: 2020-05-03

## 2020-05-03 DIAGNOSIS — N39.0 URINARY TRACT INFECTION WITHOUT HEMATURIA, SITE UNSPECIFIED: Primary | ICD-10-CM

## 2020-05-03 PROCEDURE — 99422 OL DIG E/M SVC 11-20 MIN: CPT | Performed by: PHYSICIAN ASSISTANT

## 2020-05-03 RX ORDER — PHENAZOPYRIDINE HYDROCHLORIDE 100 MG/1
100 TABLET, FILM COATED ORAL 3 TIMES DAILY PRN
Qty: 6 TABLET | Refills: 0 | Status: SHIPPED | OUTPATIENT
Start: 2020-05-03 | End: 2020-05-05

## 2020-05-03 RX ORDER — NITROFURANTOIN 25; 75 MG/1; MG/1
100 CAPSULE ORAL 2 TIMES DAILY
Qty: 10 CAPSULE | Refills: 0 | Status: SHIPPED | OUTPATIENT
Start: 2020-05-03 | End: 2020-05-08

## 2020-05-03 NOTE — PROGRESS NOTES
Oriana Mina    1990  [id number]    I have reviewed the e-Visit questionnaire and patient's answers. My assessment and plan are as follows:    CC; pain while passing urine     HISTORY OF PRESENT ILLNESS  HPI   PT is a 30 yr old f who presents via evisit with c/o pain while passing urine x 2 days.  Pt describes the pain as burning.  Associated with increased urinary urgency, feeling of incomplete emptying, and foul smell to urine.  Denies difficulty with urination and fever.  States has had similar symptoms in the past with UTI. Denies pregnancy.    Review of Systems     Neg fever, positive for dysuria, increased urinary urgency, feeling of incomplete empyting    Physical Exam   No PE performed    Diagnoses and all orders for this visit:    Urinary tract infection without hematuria, site unspecified  -     nitrofurantoin, macrocrystal-monohydrate, (Macrobid) 100 MG capsule; Take 1 capsule by mouth 2 (Two) Times a Day for 5 days.  -     phenazopyridine (Pyridium) 100 MG tablet; Take 1 tablet by mouth 3 (Three) Times a Day As Needed for Bladder Spasms for up to 2 days.        Any medications prescribed have been sent electronically to   [id number] - [place] [address] AT [facility] & [address] - [phone] PH - [phone] [address] [address]  Phone: [phone] Fax: [phone]    [facility]ie RODRÍGUEZ MD - [address] [facility] - [phone] PH - [phone] FX  [address] [facility] [place] [address]  Phone: [phone] Fax: [phone]    [id number] - [place], [place] - [address] [facility] - [phone] PH - [phone] FX  [address] [facility] [place] [address]  Phone: [phone] Fax: [phone]      Encourage fluids. Take medications as directed.  Follow up with PMD in 2-3 days for recheck.  Wear panty liner as pyridium causes secretions to be orange.  Go to the ER or Urgent  care if symptoms worsen.    LOS E visit 15 mins    Afshan Onofre PA-C  05/03/20  7:38 AM

## 2020-05-03 NOTE — PATIENT INSTRUCTIONS
Encourage fluids. Take medications as directed.  Follow up with PMD in 2-3 days for recheck.  Wear panty liner as pyridium causes secretions to be orange.  Go to the ER or Urgent care if symptoms worsen.      Urinary Tract Infection, Adult  A urinary tract infection (UTI) is an infection of any part of the urinary tract. The urinary tract includes:  · The kidneys.  · The ureters.  · The bladder.  · The urethra.  These organs make, store, and get rid of pee (urine) in the body.  What are the causes?  This is caused by germs (bacteria) in your genital area. These germs grow and cause swelling (inflammation) of your urinary tract.  What increases the risk?  You are more likely to develop this condition if:  · You have a small, thin tube (catheter) to drain pee.  · You cannot control when you pee or poop (incontinence).  · You are female, and:  ? You use these methods to prevent pregnancy:  ? A medicine that kills sperm (spermicide).  ? A device that blocks sperm (diaphragm).  ? You have low levels of a female hormone (estrogen).  ? You are pregnant.  · You have genes that add to your risk.  · You are sexually active.  · You take antibiotic medicines.  · You have trouble peeing because of:  ? A prostate that is bigger than normal, if you are male.  ? A blockage in the part of your body that drains pee from the bladder (urethra).  ? A kidney stone.  ? A nerve condition that affects your bladder (neurogenic bladder).  ? Not getting enough to drink.  ? Not peeing often enough.  · You have other conditions, such as:  ? Diabetes.  ? A weak disease-fighting system (immune system).  ? Sickle cell disease.  ? Gout.  ? Injury of the spine.  What are the signs or symptoms?  Symptoms of this condition include:  · Needing to pee right away (urgently).  · Peeing often.  · Peeing small amounts often.  · Pain or burning when peeing.  · Blood in the pee.  · Pee that smells bad or not like normal.  · Trouble peeing.  · Pee that is  cloudy.  · Fluid coming from the vagina, if you are female.  · Pain in the belly or lower back.  Other symptoms include:  · Throwing up (vomiting).  · No urge to eat.  · Feeling mixed up (confused).  · Being tired and grouchy (irritable).  · A fever.  · Watery poop (diarrhea).  How is this treated?  This condition may be treated with:  · Antibiotic medicine.  · Other medicines.  · Drinking enough water.  Follow these instructions at home:    Medicines  · Take over-the-counter and prescription medicines only as told by your doctor.  · If you were prescribed an antibiotic medicine, take it as told by your doctor. Do not stop taking it even if you start to feel better.  General instructions  · Make sure you:  ? Pee until your bladder is empty.  ? Do not hold pee for a long time.  ? Empty your bladder after sex.  ? Wipe from front to back after pooping if you are a female. Use each tissue one time when you wipe.  · Drink enough fluid to keep your pee pale yellow.  · Keep all follow-up visits as told by your doctor. This is important.  Contact a doctor if:  · You do not get better after 1-2 days.  · Your symptoms go away and then come back.  Get help right away if:  · You have very bad back pain.  · You have very bad pain in your lower belly.  · You have a fever.  · You are sick to your stomach (nauseous).  · You are throwing up.  Summary  · A urinary tract infection (UTI) is an infection of any part of the urinary tract.  · This condition is caused by germs in your genital area.  · There are many risk factors for a UTI. These include having a small, thin tube to drain pee and not being able to control when you pee or poop.  · Treatment includes antibiotic medicines for germs.  · Drink enough fluid to keep your pee pale yellow.  This information is not intended to replace advice given to you by your health care provider. Make sure you discuss any questions you have with your health care provider.  Document Released:  06/05/2009 Document Revised: 12/05/2019 Document Reviewed: 06/27/2019  [employer] Interactive Patient Education © 2020 [employer] Inc.

## 2020-05-07 ENCOUNTER — TELEPHONE (OUTPATIENT)
Dept: FAMILY MEDICINE CLINIC | Facility: CLINIC | Age: 30
End: 2020-05-07

## 2020-05-07 NOTE — TELEPHONE ENCOUNTER
We received a fax from the pharmacy requesting a refill for the patients levothyroxine.  I called the patient and she said she does not need a refill on her levothyroxine and that the pharmacy must be sending the fax through an automated system. She said she recently moved to [place].  She said she is not sure whether or not she will continue to come to me as her PCP because she had limited time for her lunch break and had to wait too long to be seen for her last two visits. I apologized to her that she had to wait and told her we would do our best to keep her wait time to a minimum if she chooses to stay with us. I suggested she let the staff know that she has limited time during her lunch break if she does schedule another appointment so we can al be aware and do our best to make sure her wait time is kept to a minimum.  I also suggested she consider a video visit, which she said she would consider.

## 2020-07-14 DIAGNOSIS — E03.8 OTHER SPECIFIED HYPOTHYROIDISM: ICD-10-CM

## 2020-07-14 RX ORDER — LEVOTHYROXINE SODIUM 0.1 MG/1
100 TABLET ORAL DAILY
Qty: 30 TABLET | Refills: 0 | Status: SHIPPED | OUTPATIENT
Start: 2020-07-14

## 2020-07-14 NOTE — TELEPHONE ENCOUNTER
Please call the patient and let her know that I called in a 30-day supply of her levothyroxine.  Please schedule her an office visit for controlled hypothyroidism, with a lab appointment at least 2 days prior to the office visit.  Thank you.

## 2020-07-20 RX ORDER — BUPROPION HYDROCHLORIDE 300 MG/1
300 TABLET ORAL DAILY
Qty: 30 TABLET | Refills: 0 | OUTPATIENT
Start: 2020-07-20

## 2020-07-20 NOTE — TELEPHONE ENCOUNTER
Please call the patient and schedule her an office visit within the next 1 to 2 weeks, and a lab appointment at least 2 days prior.  Thank you.

## 2020-10-05 RX ORDER — TOPIRAMATE 100 MG/1
100 TABLET, FILM COATED ORAL DAILY
Qty: 30 TABLET | Refills: 2 | OUTPATIENT
Start: 2020-10-05

## 2020-11-10 ENCOUNTER — TELEMEDICINE (OUTPATIENT)
Dept: FAMILY MEDICINE CLINIC | Facility: TELEHEALTH | Age: 30
End: 2020-11-10

## 2020-11-10 VITALS — WEIGHT: 173 LBS | BODY MASS INDEX: 28.82 KG/M2 | HEIGHT: 65 IN

## 2020-11-10 DIAGNOSIS — J06.9 UPPER RESPIRATORY TRACT INFECTION, UNSPECIFIED TYPE: Primary | ICD-10-CM

## 2020-11-10 DIAGNOSIS — J34.89 SINUS PRESSURE: ICD-10-CM

## 2020-11-10 PROCEDURE — 99213 OFFICE O/P EST LOW 20 MIN: CPT | Performed by: NURSE PRACTITIONER

## 2020-11-10 RX ORDER — PREDNISONE 10 MG/1
TABLET ORAL DAILY
Qty: 21 EACH | Refills: 0 | Status: SHIPPED | OUTPATIENT
Start: 2020-11-10 | End: 2020-11-16

## 2020-11-10 RX ORDER — FLUTICASONE PROPIONATE 50 MCG
2 SPRAY, SUSPENSION (ML) NASAL DAILY
Qty: 1 ML | Refills: 0 | Status: SHIPPED | OUTPATIENT
Start: 2020-11-10 | End: 2021-12-23

## 2020-11-10 RX ORDER — GUAIFENESIN AND DEXTROMETHORPHAN HYDROBROMIDE 600; 30 MG/1; MG/1
1 TABLET, EXTENDED RELEASE ORAL EVERY 12 HOURS SCHEDULED
Qty: 20 TABLET | Refills: 0 | Status: SHIPPED | OUTPATIENT
Start: 2020-11-10 | End: 2021-12-23

## 2020-11-10 NOTE — PROGRESS NOTES
CHIEF COMPLAINT  Chief Complaint   Patient presents with   • Earache     left   • Cough   • Sore Throat         HPI  Oriana Mina is a 30 y.o. female  presents with complaint of left ear pain, cough with chest congestion, mild sore throat from post nasal drainage, nasal congestion and headache present for 4 days. She is taking Zyrtec for seasonal allergies. She denies fever. She does not report known exposure to covid 19 and is not interested in testing at this time. She states she gets this every year but this time it's harder to resolve. She reports her head feels heavy with mucus and congestion.     Review of Systems   Constitutional: Negative.    HENT: Positive for congestion, ear pain (left ear painl), postnasal drip, rhinorrhea, sinus pressure, sinus pain (sinus tenderness on right maxillary region) and sore throat.    Eyes: Negative.    Respiratory: Positive for cough. Negative for shortness of breath and wheezing.    Cardiovascular: Negative.    Gastrointestinal: Negative.    Musculoskeletal: Negative.    Skin: Negative.    Allergic/Immunologic: Positive for environmental allergies.   Neurological: Positive for headaches.   Hematological: Negative.    Psychiatric/Behavioral: Negative.        Past Medical History:   Diagnosis Date   • Abnormal Pap smear of cervix    • Allergic    • Anxiety    • Depression    • Disease of thyroid gland     autoImmune and Hypothroid   • Headache    • Headache, tension-type    • Herpes    • Migraine        Family History   Problem Relation Age of Onset   • Cleft palate Daughter    • Spina bifida Daughter    • Congenital heart disease Daughter         TOF   • Seizures Daughter    • Stroke Daughter    • No Known Problems Mother    • Hypertension Father    • Asthma Brother    • Congenital heart disease Sister         TOF    • Asthma Sister    • Stroke Sister    • Thyroid disease Sister    • Arthritis Maternal Grandmother    • Arthritis Maternal Grandfather    • Neuropathy  "Paternal Grandmother    • Diabetes Paternal Grandfather    • Kidney disease Paternal Grandfather    • Stroke Paternal Grandfather        Social History     Socioeconomic History   • Marital status:      Spouse name: Not on file   • Number of children: Not on file   • Years of education: Not on file   • Highest education level: Not on file   Tobacco Use   • Smoking status: Never Smoker   • Smokeless tobacco: Never Used   Substance and Sexual Activity   • Alcohol use: Yes     Alcohol/week: 2.0 - 3.0 standard drinks     Types: 1 Glasses of wine, 1 Cans of beer per week     Comment: occasional    • Drug use: No   • Sexual activity: Defer         Ht 165.1 cm (65\")   Wt 78.5 kg (173 lb)   BMI 28.79 kg/m²     PHYSICAL EXAM  Physical Exam   Constitutional: She is oriented to person, place, and time. She appears well-developed and well-nourished. She does not have a sickly appearance. She does not appear ill. No distress.   HENT:   Head: Normocephalic and atraumatic.   Right Ear: Hearing and external ear normal.   Left Ear: External ear normal. No drainage, swelling or tenderness. Decreased hearing is noted.   Nose: Mucosal edema present. Right sinus exhibits maxillary sinus tenderness (per patient report). Right sinus exhibits no frontal sinus tenderness. Left sinus exhibits no maxillary sinus tenderness and no frontal sinus tenderness.   Nasal congestion    Eyes: Conjunctivae are normal.   Neck: Neck normal appearance.  Pulmonary/Chest: Effort normal.  No respiratory distress.  Neurological: She is alert and oriented to person, place, and time.   Psychiatric: She has a normal mood and affect.   Vitals reviewed.      Results for orders placed or performed in visit on 11/11/19   Comprehensive metabolic panel    Specimen: Blood   Result Value Ref Range    Glucose 74 65 - 99 mg/dL    BUN 9 6 - 20 mg/dL    Creatinine 0.64 0.57 - 1.00 mg/dL    eGFR Non African Am 121 >59 mL/min/1.73    eGFR African Am 139 >59 " mL/min/1.73    BUN/Creatinine Ratio 14 9 - 23    Sodium 140 134 - 144 mmol/L    Potassium 4.1 3.5 - 5.2 mmol/L    Chloride 108 (H) 96 - 106 mmol/L    Total CO2 18 (L) 20 - 29 mmol/L    Calcium 8.7 8.7 - 10.2 mg/dL    Total Protein 6.6 6.0 - 8.5 g/dL    Albumin 4.3 3.5 - 5.5 g/dL    Globulin 2.3 1.5 - 4.5 g/dL    A/G Ratio 1.9 1.2 - 2.2    Total Bilirubin <0.2 0.0 - 1.2 mg/dL    Alkaline Phosphatase 61 39 - 117 IU/L    AST (SGOT) 12 0 - 40 IU/L    ALT (SGPT) 11 0 - 32 IU/L   Lipid Panel w/ Chol/HDL Ratio    Specimen: Blood   Result Value Ref Range    Total Cholesterol 170 100 - 199 mg/dL    Triglycerides 108 0 - 149 mg/dL    HDL Cholesterol 42 >39 mg/dL    VLDL Cholesterol 22 5 - 40 mg/dL    LDL Cholesterol  106 (H) 0 - 99 mg/dL    Chol/HDL Ratio 4.0 0.0 - 4.4 ratio   TSH    Specimen: Blood   Result Value Ref Range    TSH 1.440 0.450 - 4.500 uIU/mL   Microscopic Examination -   Result Value Ref Range    WBC, UA 0-5 0 - 5 /hpf    RBC, UA 0-2 0 - 2 /hpf    Epithelial Cells (non renal) 0-10 0 - 10 /hpf    Mucus, UA Present Not Estab.    Bacteria, UA Few None seen/Few   CBC w AUTO Differential    Specimen: Blood   Result Value Ref Range    WBC 8.6 3.4 - 10.8 x10E3/uL    RBC 4.31 3.77 - 5.28 x10E6/uL    Hemoglobin 12.8 11.1 - 15.9 g/dL    Hematocrit 37.2 34.0 - 46.6 %    MCV 86 79 - 97 fL    MCH 29.7 26.6 - 33.0 pg    MCHC 34.4 31.5 - 35.7 g/dL    RDW 13.5 12.3 - 15.4 %    Platelets 283 150 - 450 x10E3/uL    Neutrophil Rel % 67 Not Estab. %    Lymphocyte Rel % 25 Not Estab. %    Monocyte Rel % 7 Not Estab. %    Eosinophil Rel % 1 Not Estab. %    Basophil Rel % 0 Not Estab. %    Neutrophils Absolute 5.7 1.4 - 7.0 x10E3/uL    Lymphocytes Absolute 2.2 0.7 - 3.1 x10E3/uL    Monocytes Absolute 0.6 0.1 - 0.9 x10E3/uL    Eosinophils Absolute 0.1 0.0 - 0.4 x10E3/uL    Basophils Absolute 0.0 0.0 - 0.2 x10E3/uL    Immature Granulocyte Rel % 0 Not Estab. %    Immature Grans Absolute 0.0 0.0 - 0.1 x10E3/uL   Urinalysis With  Microscopic - Urine, Clean Catch    Specimen: Urine, Clean Catch   Result Value Ref Range    Specific Gravity, UA 1.021 1.005 - 1.030    pH, UA 6.5 5.0 - 7.5    Color, UA Yellow Yellow    Appearance, UA Clear Clear    Leukocytes, UA Negative Negative    Protein Negative Negative/Trace    Glucose, UA Negative Negative    Ketones Negative Negative    Blood, UA Negative Negative    Bilirubin, UA Negative Negative    Urobilinogen, UA 0.2 0.2 - 1.0 mg/dL    Nitrite, UA Negative Negative    Microscopic Examination Comment     Microscopic Examination See below:        Diagnoses and all orders for this visit:    1. Upper respiratory tract infection, unspecified type (Primary)  -     predniSONE (DELTASONE) 10 MG (21) dose pack; Take  by mouth Daily for 6 days.  Dispense: 21 each; Refill: 0  -     fluticasone (Flonase) 50 MCG/ACT nasal spray; 2 sprays into the nostril(s) as directed by provider Daily.  Dispense: 1 mL; Refill: 0  -     guaifenesin-dextromethorphan (MUCINEX DM)  MG tablet sustained-release 12 hour tablet; Take 1 tablet by mouth Every 12 (Twelve) Hours.  Dispense: 20 tablet; Refill: 0    2. Sinus pressure  -     predniSONE (DELTASONE) 10 MG (21) dose pack; Take  by mouth Daily for 6 days.  Dispense: 21 each; Refill: 0  -     fluticasone (Flonase) 50 MCG/ACT nasal spray; 2 sprays into the nostril(s) as directed by provider Daily.  Dispense: 1 mL; Refill: 0    increase fluids and rest  Follow up prn worsening s/s  Most often these initial symptoms are associated with a virus or even allergies.  According to guidelines in treating sinus infections, studies show that 50%-67% of sinusitis cases are reportedly attributable to viruses.    Criteria for prescribing antibiotics for sinus infection:  1.  signs or symptoms of sinusitis (cough, congestion, and/or post-nasal drip) that last = 10 days without clinical improvement  2.  worsening signs or symptoms following initial improvement (double sickening)  3.   severe symptoms including fever = 39 degrees C (102 degrees F) and purulent nasal discharge lasting = 3-4 consecutive days  4.  unilateral cheek or maxillary tooth pain and purulent nasal discharge    **if at any time experiences fever AND/OR worsening cough AND/OR shortness of breath AND/OR loss of sense of taste or smell, has been advised to go to nearest urgent care or emergency department for evaluation AND/OR testing    **if no improvement, but not worsening, may schedule a f/u virtual visit or E-visit      FOLLOW-UP  As discussed during visit with PCP/Virtual Care if no improvement or Urgent Care/Emergency Department if worsening of symptoms    Patient verbalizes understanding of medication dosage, comfort measures, instructions for treatment and follow-up.    Wendi Guevara, NELDA  11/10/2020  11:40 EST    This visit was performed via Telehealth.  This patient has been instructed to follow-up with their primary care provider if their symptoms worsen or the treatment provided does not resolve their illness.

## 2020-11-11 ENCOUNTER — E-VISIT (OUTPATIENT)
Dept: FAMILY MEDICINE CLINIC | Facility: TELEHEALTH | Age: 30
End: 2020-11-11

## 2020-11-11 DIAGNOSIS — H10.9 CONJUNCTIVITIS OF RIGHT EYE, UNSPECIFIED CONJUNCTIVITIS TYPE: Primary | ICD-10-CM

## 2020-11-11 PROCEDURE — 99422 OL DIG E/M SVC 11-20 MIN: CPT | Performed by: NURSE PRACTITIONER

## 2020-11-11 RX ORDER — TOBRAMYCIN 3 MG/ML
1 SOLUTION/ DROPS OPHTHALMIC EVERY 8 HOURS SCHEDULED
Qty: 5 ML | Refills: 0 | Status: SHIPPED | OUTPATIENT
Start: 2020-11-11 | End: 2021-12-23

## 2020-11-11 NOTE — PATIENT INSTRUCTIONS
Bacterial Conjunctivitis, Adult  Bacterial conjunctivitis is an infection of the clear membrane that covers the white part of your eye and the inner surface of your eyelid (conjunctiva). When the blood vessels in your conjunctiva become inflamed, your eye becomes red or pink, and it will probably feel itchy. Bacterial conjunctivitis spreads very easily from person to person (is contagious). It also spreads easily from one eye to the other eye.  What are the causes?  This condition is caused by bacteria. You may get the infection if you come into close contact with:  · A person who is infected with the bacteria.  · Items that are contaminated with the bacteria, such as a face towel, contact lens solution, or eye makeup.  What increases the risk?  You are more likely to develop this condition if you:  · Are exposed to other people who have the infection.  · Wear contact lenses.  · Have a sinus infection.  · Have had a recent eye injury or surgery.  · Have a weak body defense system (immune system).  · Have a medical condition that causes dry eyes.  What are the signs or symptoms?  Symptoms of this condition include:  · Thick, yellowish discharge from the eye. This may turn into a crust on the eyelid overnight and cause your eyelids to stick together.  · Tearing or watery eyes.  · Itchy eyes.  · Burning feeling in your eyes.  · Eye redness.  · Swollen eyelids.  · Blurred vision.  How is this diagnosed?  This condition is diagnosed based on your symptoms and medical history. Your health care provider may also take a sample of discharge from your eye to find the cause of your infection. This is rarely done.  How is this treated?  This condition may be treated with:  · Antibiotic eye drops or ointment to clear the infection more quickly and prevent the spread of infection to others.  · Oral antibiotic medicines to treat infections that do not respond to drops or ointments or that last longer than 10 days.  · Cool, wet  cloths (cool compresses) placed on the eyes.  · Artificial tears applied 2-6 times a day.  Follow these instructions at home:  Medicines  · Take or apply your antibiotic medicine as told by your health care provider. Do not stop taking or applying the antibiotic even if you start to feel better.  · Take or apply over-the-counter and prescription medicines only as told by your health care provider.  · Be very careful to avoid touching the edge of your eyelid with the eye-drop bottle or the ointment tube when you apply medicines to the affected eye. This will keep you from spreading the infection to your other eye or to other people.  Managing discomfort  · Gently wipe away any drainage from your eye with a warm, wet washcloth or a cotton ball.  · Apply a clean, cool compress to your eye for 10-20 minutes, 3-4 times a day.  General instructions  · Do not wear contact lenses until the inflammation is gone and your health care provider says it is safe to wear them again. Ask your health care provider how to sterilize or replace your contact lenses before you use them again. Wear glasses until you can resume wearing contact lenses.  · Avoid wearing eye makeup until the inflammation is gone. Throw away any old eye cosmetics that may be contaminated.  · Change or wash your pillowcase every day.  · Do not share towels or washcloths. This may spread the infection.  · Wash your hands often with soap and water. Use paper towels to dry your hands.  · Avoid touching or rubbing your eyes.  · Do not drive or use heavy machinery if your vision is blurred.  Contact a health care provider if:  · You have a fever.  · Your symptoms do not get better after 10 days.  Get help right away if you have:  · A fever and your symptoms suddenly get worse.  · Severe pain when you move your eye.  · Facial pain, redness, or swelling.  · Sudden loss of vision.  Summary  · Bacterial conjunctivitis is an infection of the clear membrane that covers the  white part of your eye and the inner surface of your eyelid (conjunctiva).  · Bacterial conjunctivitis spreads very easily from person to person (is contagious).  · Wash your hands often with soap and water. Use paper towels to dry your hands.  · Take or apply your antibiotic medicine as told by your health care provider. Do not stop taking or applying the antibiotic even if you start to feel better.  · Contact a health care provider if you have a fever or your symptoms do not get better after 10 days.  This information is not intended to replace advice given to you by your health care provider. Make sure you discuss any questions you have with your health care provider.  Document Released: 12/18/2006 Document Revised: 04/07/2020 Document Reviewed: 07/24/2019  Elsevier Patient Education © 2020 [employer]    Viral Conjunctivitis, Adult    Viral conjunctivitis is an inflammation of the clear membrane that covers the white part of your eye and the inner surface of your eyelid (conjunctiva). The inflammation is caused by a viral infection. The blood vessels in the conjunctiva become inflamed, causing the eye to become red or pink, and often itchy. Viral conjunctivitis can be easily passed from one person to another (is contagious). This condition is often called pink eye.  What are the causes?  This condition is caused by a virus. A virus is a type of contagious germ. It can be spread by touching objects that have been contaminated with the virus, such as doorknobs or towels. It can also be passed through droplets, such as from coughing or sneezing.  What are the signs or symptoms?  Symptoms of this condition include:  · Eye redness.  · Tearing or watery eyes.  · Itchy and irritated eyes.  · Burning feeling in the eyes.  · Clear drainage from the eye.  · Swollen eyelids.  · A gritty feeling in the eye.  · Light sensitivity.  This condition often occurs with other symptoms, such as a fever, nausea, or a rash.  How is  this diagnosed?  This condition is diagnosed with a medical history and physical exam. If you have discharge from your eye, the discharge may be tested to rule out other causes of conjunctivitis.  How is this treated?  Viral conjunctivitis does not respond to medicines that kill bacteria (antibiotics). Treatment for viral conjunctivitis is directed at stopping a bacterial infection from developing in addition to the viral infection. Treatment also aims to relieve your symptoms, such as itching. This may be done with antihistamine drops or other eye medicines. Rarely, steroid eye drops or antiviral medicines may be prescribed.  Follow these instructions at home:  Medicines    · Take or apply over-the-counter and prescription medicines only as told by your health care provider.  · Be very careful to avoid touching the edge of the eyelid with the eye drop bottle or ointment tube when applying medicines to the affected eye. Being careful this way will stop you from spreading the infection to the other eye or to other people.  Eye care  · Avoid touching or rubbing your eyes.  · Apply a warm, wet, clean washcloth to your eye for 10-20 minutes, 3-4 times per day or as told by your health care provider.  · If you wear contact lenses, do not wear them until the inflammation is gone and your health care provider says it is safe to wear them again. Ask your health care provider how to sterilize or replace your contact lenses before using them again. Wear glasses until you can resume wearing contacts.  · Avoid wearing eye makeup until the inflammation is gone. Throw away any old eye cosmetics that may be contaminated.  · Gently wipe away any drainage from your eye with a warm, wet washcloth or a cotton ball.  General instructions  · Change or wash your pillowcase every day or as told by your health care provider.  · Do not share towels, pillowcases, washcloths, eye makeup, makeup brushes, contact lenses, or glasses. This may  spread the infection.  · Wash your hands often with soap and water. Use paper towels to dry your hands. If soap and water are not available, use hand .  · Try to avoid contact with other people for one week or as told by your health care provider.  Contact a health care provider if:  · Your symptoms do not improve with treatment or they get worse.  · You have increased pain.  · Your vision becomes blurry.  · You have a fever.  · You have facial pain, redness, or swelling.  · You have yellow or green drainage coming from your eye.  · You have new symptoms.  This information is not intended to replace advice given to you by your health care provider. Make sure you discuss any questions you have with your health care provider.  Document Released: 03/09/2004 Document Revised: 04/07/2020 Document Reviewed: 07/04/2017  Elsevier Patient Education © 2020 [employer]

## 2020-11-11 NOTE — PROGRESS NOTES
Oriana Mina    1990  [id number]    I have reviewed the e-Visit questionnaire and patient's answers, my assessment and plan are as follows:      HPI  29 y/o female treated for respiratory infection yesterday woke this morning with her right eye crusted shut.  She also states her right eye is itching and has thick discharge.  She denies fever    Review of Systems - Negative except symptoms listed in HPI      Diagnoses and all orders for this visit:    1. Conjunctivitis of right eye, unspecified conjunctivitis type (Primary)  -     tobramycin 0.3 % solution ophthalmic solution; Administer 1 drop to the right eye Every 8 (Eight) Hours.  Dispense: 5 mL; Refill: 0  --instill Tobramycin drops as prescribed for eye infection  --do not touch eye, if you do, wash hands immediately  --if left eye begins to have symptoms, may also treat with same instructions  --further instructions sent via [facility]    **if no improvement in 2-3 days, advise visit to Urgent Care for further evaluation    **if change in vision, eye pain, swelling, warmth, tenderness occur, seek immediate care at  or ER for evaluation        Any medications prescribed have been sent electronically to   [id number] - [place], [place] - [address] - [phone] PH - [phone]   [address]  [facility] [address]  Phone: [phone] Fax: [phone]    [facility] RODR[facility]GUEZ [place] - [address] - [phone] PH - [phone] FX  [address] [facility] [place] [address]  Phone: [phone] Fax: [phone]    [id number] - [place], [place] - [address] - [phone] PH - [phone] FX  [address] [facility] [address]  Phone: [phone] Fax: [phone]      Time Documentation  Counseled patient  Counseling topics: diagnosis, treatment options and return instructions  Total encounter time: counseling time  more than 50% of visit: 20 minutes        Stephanie Spaulding, APRN  11/11/20  07:48 EST

## 2020-12-11 ENCOUNTER — RESULTS ENCOUNTER (OUTPATIENT)
Dept: FAMILY MEDICINE CLINIC | Facility: CLINIC | Age: 30
End: 2020-12-11

## 2020-12-11 DIAGNOSIS — E78.2 MIXED HYPERLIPIDEMIA: ICD-10-CM

## 2020-12-11 DIAGNOSIS — M54.2 NECK PAIN: ICD-10-CM

## 2020-12-11 DIAGNOSIS — Z00.00 ENCOUNTER FOR WELL ADULT EXAM WITHOUT ABNORMAL FINDINGS: ICD-10-CM

## 2020-12-11 DIAGNOSIS — F41.9 ANXIETY: ICD-10-CM

## 2020-12-11 DIAGNOSIS — E03.8 OTHER SPECIFIED HYPOTHYROIDISM: ICD-10-CM

## 2020-12-11 DIAGNOSIS — R53.83 FATIGUE, UNSPECIFIED TYPE: ICD-10-CM

## 2020-12-11 DIAGNOSIS — K58.0 IRRITABLE BOWEL SYNDROME WITH DIARRHEA: ICD-10-CM

## 2020-12-11 DIAGNOSIS — E66.3 OVERWEIGHT (BMI 25.0-29.9): ICD-10-CM

## 2021-12-23 ENCOUNTER — TELEMEDICINE (OUTPATIENT)
Dept: FAMILY MEDICINE CLINIC | Facility: TELEHEALTH | Age: 31
End: 2021-12-23

## 2021-12-23 DIAGNOSIS — N39.0 URINARY TRACT INFECTION WITHOUT HEMATURIA, SITE UNSPECIFIED: Primary | ICD-10-CM

## 2021-12-23 PROCEDURE — 99213 OFFICE O/P EST LOW 20 MIN: CPT | Performed by: NURSE PRACTITIONER

## 2021-12-23 RX ORDER — CEFDINIR 300 MG/1
300 CAPSULE ORAL 2 TIMES DAILY
Qty: 10 CAPSULE | Refills: 0 | Status: SHIPPED | OUTPATIENT
Start: 2021-12-23 | End: 2021-12-28

## 2021-12-23 RX ORDER — FERROUS SULFATE 325(65) MG
TABLET ORAL
COMMUNITY

## 2021-12-23 RX ORDER — PHENAZOPYRIDINE HYDROCHLORIDE 200 MG/1
200 TABLET, FILM COATED ORAL 3 TIMES DAILY PRN
Qty: 6 TABLET | Refills: 0 | Status: SHIPPED | OUTPATIENT
Start: 2021-12-23 | End: 2021-12-25

## 2021-12-23 RX ORDER — TRIAMCINOLONE ACETONIDE 1 MG/G
CREAM TOPICAL
COMMUNITY
Start: 2021-12-22

## 2021-12-23 RX ORDER — AMITRIPTYLINE HYDROCHLORIDE 10 MG/1
TABLET, FILM COATED ORAL
COMMUNITY
Start: 2021-09-20

## 2021-12-23 NOTE — PROGRESS NOTES
Mode of Visit: Video  Location of patient: home  You have chosen to receive care through a telehealth visit.  The patient has signed the video visit consent form.  The visit included audio and video interaction. No technical issues occurred during this visit.     Chief Complaint  Urinary Tract Infection    Subjective          Oriana Mina presents to [facility]  Last UTI was about 3 years ago, this episode with symptom onset yesterday, burning and discomfort with every urination. No back pain but does have suprapubic pain.    Urinary Tract Infection   This is a new problem. The current episode started yesterday. The problem occurs every urination. The quality of the pain is described as burning. There has been no fever. Associated symptoms include frequency and urgency. Pertinent negatives include no chills, flank pain, hematuria, nausea or vomiting. Treatments tried: pyridium. The treatment provided mild relief. There is no history of kidney stones.     Review of Systems   Constitutional: Negative for chills.   Gastrointestinal: Negative for nausea and vomiting.   Genitourinary: Positive for frequency and urgency. Negative for flank pain and hematuria.     Objective   Vital Signs:   There were no vitals taken for this visit.    Physical Exam   Constitutional: She appears well-developed and well-nourished. No distress.   Pulmonary/Chest: Effort normal.   Neurological: She is alert.   Psychiatric: She has a normal mood and affect.     Result Review :                 Assessment and Plan {CC Problem List  Visit Diagnosis   ROS  Review (Popup)  Health Maintenance  Quality  BestPractice  Medications  SmartSets  SnapShot Encounters  Media :23}   Diagnoses and all orders for this visit:    1. Urinary tract infection without hematuria, site unspecified (Primary)  -     cefdinir (OMNICEF) 300 MG capsule; Take 1 capsule by mouth 2 (Two) Times a Day for 5 days.  Dispense: 10  capsule; Refill: 0  -     phenazopyridine (Pyridium) 200 MG tablet; Take 1 tablet by mouth 3 (Three) Times a Day As Needed for Bladder Spasms for up to 2 days.  Dispense: 6 tablet; Refill: 0        I spent 20 minutes caring for Oriana on this date of service. This time includes time spent by me in the following activities:preparing for the visit, obtaining and/or reviewing a separately obtained history, performing a medically appropriate examination and/or evaluation , counseling and educating the patient/family/caregiver, ordering medications, tests, or procedures, and documenting information in the medical record  Follow Up   No follow-ups on file.  Patient was given instructions and counseling regarding her condition or for health maintenance advice. Please see specific information pulled into the AVS if appropriate.

## 2021-12-23 NOTE — PATIENT INSTRUCTIONS
Urinary Tract Infection, Adult    A urinary tract infection (UTI) is an infection of any part of the urinary tract. The urinary tract includes the kidneys, ureters, bladder, and urethra. These organs make, store, and get rid of urine in the body.  Your health care provider may use other names to describe the infection. An upper UTI affects the ureters and kidneys (pyelonephritis). A lower UTI affects the bladder (cystitis) and urethra (urethritis).  What are the causes?  Most urinary tract infections are caused by bacteria in your genital area, around the entrance to your urinary tract (urethra). These bacteria grow and cause inflammation of your urinary tract.  What increases the risk?  You are more likely to develop this condition if:  · You have a urinary catheter that stays in place (indwelling).  · You are not able to control when you urinate or have a bowel movement (you have incontinence).  · You are female and you:  ? Use a spermicide or diaphragm for birth control.  ? Have low estrogen levels.  ? Are pregnant.  · You have certain genes that increase your risk (genetics).  · You are sexually active.  · You take antibiotic medicines.  · You have a condition that causes your flow of urine to slow down, such as:  ? An enlarged prostate, if you are male.  ? Blockage in your urethra (stricture).  ? A kidney stone.  ? A nerve condition that affects your bladder control (neurogenic bladder).  ? Not getting enough to drink, or not urinating often.  · You have certain medical conditions, such as:  ? Diabetes.  ? A weak disease-fighting system (immunesystem).  ? Sickle cell disease.  ? Gout.  ? Spinal cord injury.  What are the signs or symptoms?  Symptoms of this condition include:  · Needing to urinate right away (urgently).  · Frequent urination or passing small amounts of urine frequently.  · Pain or burning with urination.  · Blood in the urine.  · Urine that smells bad or unusual.  · Trouble urinating.  · Cloudy  urine.  · Vaginal discharge, if you are female.  · Pain in the abdomen or the lower back.  You may also have:  · Vomiting or a decreased appetite.  · Confusion.  · Irritability or tiredness.  · A fever.  · Diarrhea.  The first symptom in older adults may be confusion. In some cases, they may not have any symptoms until the infection has worsened.  How is this diagnosed?  This condition is diagnosed based on your medical history and a physical exam. You may also have other tests, including:  · Urine tests.  · Blood tests.  · Tests for sexually transmitted infections (STIs).  If you have had more than one UTI, a cystoscopy or imaging studies may be done to determine the cause of the infections.  How is this treated?  Treatment for this condition includes:  · Antibiotic medicine.  · Over-the-counter medicines to treat discomfort.  · Drinking enough water to stay hydrated.  If you have frequent infections or have other conditions such as a kidney stone, you may need to see a health care provider who specializes in the urinary tract (urologist).  In rare cases, urinary tract infections can cause sepsis. Sepsis is a life-threatening condition that occurs when the body responds to an infection. Sepsis is treated in the hospital with IV antibiotics, fluids, and other medicines.  Follow these instructions at home:    Medicines  · Take over-the-counter and prescription medicines only as told by your health care provider.  · If you were prescribed an antibiotic medicine, take it as told by your health care provider. Do not stop using the antibiotic even if you start to feel better.  General instructions  · Make sure you:  ? Empty your bladder often and completely. Do not hold urine for long periods of time.  ? Empty your bladder after sex.  ? Wipe from front to back after a bowel movement if you are female. Use each tissue one time when you wipe.  · Drink enough fluid to keep your urine pale yellow.  · Keep all follow-up  visits as told by your health care provider. This is important.  Contact a health care provider if:  · Your symptoms do not get better after 1-2 days.  · Your symptoms go away and then return.  Get help right away if you have:  · Severe pain in your back or your lower abdomen.  · A fever.  · Nausea or vomiting.  Summary  · A urinary tract infection (UTI) is an infection of any part of the urinary tract, which includes the kidneys, ureters, bladder, and urethra.  · Most urinary tract infections are caused by bacteria in your genital area, around the entrance to your urinary tract (urethra).  · Treatment for this condition often includes antibiotic medicines.  · If you were prescribed an antibiotic medicine, take it as told by your health care provider. Do not stop using the antibiotic even if you start to feel better.  · Keep all follow-up visits as told by your health care provider. This is important.  This information is not intended to replace advice given to you by your health care provider. Make sure you discuss any questions you have with your health care provider.  Document Revised: 12/05/2019 Document Reviewed: 06/27/2019  [employer] Patient Education © 2021 [employer]